# Patient Record
Sex: FEMALE | Race: WHITE | Employment: UNEMPLOYED | RURAL
[De-identification: names, ages, dates, MRNs, and addresses within clinical notes are randomized per-mention and may not be internally consistent; named-entity substitution may affect disease eponyms.]

---

## 2018-08-21 ENCOUNTER — OFFICE VISIT (OUTPATIENT)
Dept: FAMILY MEDICINE CLINIC | Age: 28
End: 2018-08-21

## 2018-08-21 VITALS
TEMPERATURE: 97.5 F | RESPIRATION RATE: 16 BRPM | HEIGHT: 65 IN | SYSTOLIC BLOOD PRESSURE: 110 MMHG | BODY MASS INDEX: 34.16 KG/M2 | HEART RATE: 60 BPM | DIASTOLIC BLOOD PRESSURE: 75 MMHG | WEIGHT: 205 LBS | OXYGEN SATURATION: 96 %

## 2018-08-21 DIAGNOSIS — F41.9 ANXIETY AND DEPRESSION: ICD-10-CM

## 2018-08-21 DIAGNOSIS — Z76.89 ENCOUNTER TO ESTABLISH CARE WITH NEW DOCTOR: ICD-10-CM

## 2018-08-21 DIAGNOSIS — M79.89 LEG SWELLING: ICD-10-CM

## 2018-08-21 DIAGNOSIS — F32.A ANXIETY AND DEPRESSION: ICD-10-CM

## 2018-08-21 DIAGNOSIS — M25.552 PAIN OF BOTH HIP JOINTS: Primary | ICD-10-CM

## 2018-08-21 DIAGNOSIS — M25.551 PAIN OF BOTH HIP JOINTS: Primary | ICD-10-CM

## 2018-08-21 DIAGNOSIS — G43.909 MIGRAINE WITHOUT STATUS MIGRAINOSUS, NOT INTRACTABLE, UNSPECIFIED MIGRAINE TYPE: ICD-10-CM

## 2018-08-21 RX ORDER — TOPIRAMATE 100 MG/1
100 TABLET, FILM COATED ORAL 2 TIMES DAILY
Qty: 60 TAB | Refills: 2 | Status: SHIPPED | OUTPATIENT
Start: 2018-08-21 | End: 2018-12-17 | Stop reason: SDUPTHER

## 2018-08-21 RX ORDER — DICLOFENAC SODIUM 50 MG/1
50 TABLET, DELAYED RELEASE ORAL
Qty: 30 TAB | Refills: 0 | Status: SHIPPED | OUTPATIENT
Start: 2018-08-21 | End: 2018-09-21 | Stop reason: SDUPTHER

## 2018-08-21 RX ORDER — BUPROPION HYDROCHLORIDE 100 MG/1
100 TABLET ORAL 2 TIMES DAILY
Qty: 60 TAB | Refills: 2 | Status: SHIPPED | OUTPATIENT
Start: 2018-08-21 | End: 2018-12-17 | Stop reason: SDUPTHER

## 2018-08-21 NOTE — PATIENT INSTRUCTIONS
Try compression stockings to help with tightness of hip. You may take Diclofenac 50mg every 8 hours as needed for pain. Hamstring Syndrome: Care Instructions  Your Care Instructions    The hamstring muscles are the three muscles in the back of the thigh. The sciatic nerve is a large nerve that runs from the low back down the legs. Hamstring syndrome is a condition caused by pressure on this nerve. The nerve may be pinched between the hamstring muscles and the pelvic bone or by the band of tissue that connects the hamstring muscles. This condition can cause pain in the hip and buttock and sometimes numbness down the back of the leg. It may hurt to sit down or stretch the hamstrings. You may have less pain when you lie on your back. Hamstring syndrome may be the result of wear and tear to the back and hamstrings. It is most often seen in people who play sports that involve running, kicking, or jumping. Other problems can cause leg pain and numbness. To diagnose hamstring syndrome, the doctor will ask about your symptoms and your activities and examine your leg. Hamstring syndrome usually gets better in a few weeks with rest and home care. The doctor may recommend exercises to stretch and strengthen your hip muscles. If home care doesn't help, your doctor may suggest a steroid shot to help reduce pain and swelling. Follow-up care is a key part of your treatment and safety. Be sure to make and go to all appointments, and call your doctor if you are having problems. It's also a good idea to know your test results and keep a list of the medicines you take. How can you care for yourself at home? · Ask your doctor if you can take an over-the-counter pain medicine, such as acetaminophen (Tylenol), ibuprofen (Advil, Motrin), or naproxen (Aleve). Be safe with medicines. Read and follow all instructions on the label. · Put ice or a cold pack on the painful area for 10 to 20 minutes at a time.  Try to do this every 1 to 2 hours for the next 3 days (when you are awake) or until the swelling goes down. Put a thin cloth between the ice and your skin. · After 2 or 3 days, if your swelling is gone, apply heat. Put a warm water bottle, a heating pad set on low, or a warm cloth over the painful area. Do not go to sleep with a heating pad on your skin. · Avoid sitting if possible, unless it feels better than standing. · Alternate lying down with short walks. Increase your walking distance as you are able to walk without making your symptoms worse. · Don't do anything that makes your symptoms worse. Return to your usual level of activity slowly. When should you call for help? Watch closely for changes in your health, and be sure to contact your doctor if:    · You have new or worse pain.     · You have new symptoms.     · You do not get better as expected. Where can you learn more? Go to http://viri-jean.info/. Enter L763 in the search box to learn more about \"Hamstring Syndrome: Care Instructions. \"  Current as of: November 29, 2017  Content Version: 11.7  © 3948-2222 [employer]. Care instructions adapted under license by [employer] (which disclaims liability or warranty for this information). If you have questions about a medical condition or this instruction, always ask your healthcare professional. [address] any warranty or liability for your use of this information. Iliotibial Band Syndrome: Exercises  Your Care Instructions  Here are some examples of typical rehabilitation exercises for your condition. Start each exercise slowly. Ease off the exercise if you start to have pain. Your doctor or physical therapist will tell you when you can start these exercises and which ones will work best for you. How to do the exercises  Iliotibial band stretch    1. Lean sideways against a wall.  If you are not steady on your feet, hold on to a chair or counter. 2. Stand on the leg with the affected hip, with that leg close to the wall. Then cross your other leg in front of it. 3. Let your affected hip drop out to the side of your body and against the wall. Then lean away from your affected hip until you feel a stretch. 4. Hold the stretch for 15 to 30 seconds. 5. Repeat 2 to 4 times. Piriformis stretch    1. Lie on your back with your legs straight. 2. Lift your affected leg and bend your knee. With your opposite hand, reach across your body, and then gently pull your knee toward your opposite shoulder. 3. Hold the stretch for 15 to 30 seconds. 4. Repeat 2 to 4 times. Hamstring wall stretch    1. Lie on your back in a doorway, with your good leg through the open door. 2. Slide your affected leg up the wall to straighten your knee. You should feel a gentle stretch down the back of your leg. 1. Do not arch your back. 2. Do not bend either knee. 3. Keep one heel touching the floor and the other heel touching the wall. Do not point your toes. 3. Hold the stretch for at least 1 minute to begin. Then try to lengthen the time you hold the stretch to as long as 6 minutes. 4. Repeat 2 to 4 times. 5. If you do not have a place to do this exercise in a doorway, there is another way to do it:  6. Lie on your back, and bend the knee of your affected leg. 7. Loop a towel under the ball and toes of that foot, and hold the ends of the towel in your hands. 8. Straighten your knee, and slowly pull back on the towel. You should feel a gentle stretch down the back of your leg. 9. Hold the stretch for 15 to 30 seconds. Or even better, hold the stretch for 1 minute if you can. 10. Repeat 2 to 4 times. Follow-up care is a key part of your treatment and safety. Be sure to make and go to all appointments, and call your doctor if you are having problems. It's also a good idea to know your test results and keep a list of the medicines you take.   Where can you learn more?  Go to http://viri-jean.info/. Enter P252 in the search box to learn more about \"Iliotibial Band Syndrome: Exercises. \"  Current as of: November 29, 2017  Content Version: 11.7  © 1036-3353 [employer]. Care instructions adapted under license by [employer] (which disclaims liability or warranty for this information). If you have questions about a medical condition or this instruction, always ask your healthcare professional. [address] any warranty or liability for your use of this information. A Healthy Lifestyle: Care Instructions  Your Care Instructions    A healthy lifestyle can help you feel good, stay at a healthy weight, and have plenty of energy for both work and play. A healthy lifestyle is something you can share with your whole family. A healthy lifestyle also can lower your risk for serious health problems, such as high blood pressure, heart disease, and diabetes. You can follow a few steps listed below to improve your health and the health of your family. Follow-up care is a key part of your treatment and safety. Be sure to make and go to all appointments, and call your doctor if you are having problems. It's also a good idea to know your test results and keep a list of the medicines you take. How can you care for yourself at home? · Do not eat too much sugar, fat, or fast foods. You can still have dessert and treats now and then. The goal is moderation. · Start small to improve your eating habits. Pay attention to portion sizes, drink less juice and soda pop, and eat more fruits and vegetables. ¨ Eat a healthy amount of food. A 3-ounce serving of meat, for example, is about the size of a deck of cards. Fill the rest of your plate with vegetables and whole grains. ¨ Limit the amount of soda and sports drinks you have every day. Drink more water when you are thirsty.   ¨ Eat at least 5 servings of fruits and vegetables every day. It may seem like a lot, but it is not hard to reach this goal. A serving or helping is 1 piece of fruit, 1 cup of vegetables, or 2 cups of leafy, raw vegetables. Have an apple or some carrot sticks as an afternoon snack instead of a candy bar. Try to have fruits and/or vegetables at every meal.  · Make exercise part of your daily routine. You may want to start with simple activities, such as walking, bicycling, or slow swimming. Try to be active 30 to 60 minutes every day. You do not need to do all 30 to 60 minutes all at once. For example, you can exercise 3 times a day for 10 or 20 minutes. Moderate exercise is safe for most people, but it is always a good idea to talk to your doctor before starting an exercise program.  · Keep moving. [employer] the lawn, work in the garden, or [employer]. Take the stairs instead of the elevator at work. · If you smoke, quit. People who smoke have an increased risk for heart attack, stroke, cancer, and other lung illnesses. Quitting is hard, but there are ways to boost your chance of quitting tobacco for good. ¨ Use nicotine gum, patches, or lozenges. ¨ Ask your doctor about stop-smoking programs and medicines. ¨ Keep trying. In addition to reducing your risk of diseases in the future, you will notice some benefits soon after you stop using tobacco. If you have shortness of breath or asthma symptoms, they will likely get better within a few weeks after you quit. · Limit how much alcohol you drink. Moderate amounts of alcohol (up to 2 drinks a day for men, 1 drink a day for women) are okay. But drinking too much can lead to liver problems, high blood pressure, and other health problems. Family health  If you have a family, there are many things you can do together to improve your health. · Eat meals together as a family as often as possible. · Eat healthy foods. This includes fruits, vegetables, lean meats and dairy, and whole grains.   · Include your family in your fitness plan. Most people think of activities such as jogging or tennis as the way to fitness, but there are many ways you and your family can be more active. Anything that makes you breathe hard and gets your heart pumping is exercise. Here are some tips:  ¨ Walk to do errands or to take your child to school or the bus. ¨ Go for a family bike ride after dinner instead of watching TV. Where can you learn more? Go to http://viri-jean.info/. Enter X689 in the search box to learn more about \"A Healthy Lifestyle: Care Instructions. \"  Current as of: December 7, 2017  Content Version: 11.7  © 5124-7609 [employer]. Care instructions adapted under license by [employer] (which disclaims liability or warranty for this information). If you have questions about a medical condition or this instruction, always ask your healthcare professional. [address] any warranty or liability for your use of this information.

## 2018-08-21 NOTE — MR AVS SNAPSHOT
Anselmo Souza 
 
 
 2005 [address] [address] 
[phone] Patient: Sintia Hay MRN: [id number] LFF:12/25/4095 Visit Information Date & Time Provider Department Dept. Phone Encounter #  
 8/21/2018 11:00 AM Shanita Holley MD [facility] [id number] Upcoming Health Maintenance Date Due Pneumococcal 19-64 Medium Risk (1 of 1 - PPSV23) 11/25/2009 DTaP/Tdap/Td series (1 - Tdap) 11/25/2011 PAP AKA CERVICAL CYTOLOGY 11/25/2011 Influenza Age 5 to Adult 8/1/2018 Allergies as of 8/21/2018  Review Complete On: 8/21/2018 By: Emeka Willis LPN Severity Noted Reaction Type Reactions Sulfur High 04/28/2011   Systemic Hives Current Immunizations  Never Reviewed No immunizations on file. Not reviewed this visit You Were Diagnosed With   
  
 Codes Comments Anxiety and depression    -  Primary ICD-10-CM: F41.9, F32.9 ICD-9-CM: 300.00, 311 Migraine without status migrainosus, not intractable, unspecified migraine type     ICD-10-CM: G43.909 ICD-9-CM: 346.90 Pain of both hip joints     ICD-10-CM: M25.551, M25.552 ICD-9-CM: 719.45 Vitals BP Pulse Temp Resp Height(growth percentile) Weight(growth percentile) 110/75 (BP 1 Location: Right arm, BP Patient Position: Sitting) 60 97.5 °F (36.4 °C) (Oral) 16 5' 4.5\" (1.638 m) 205 lb (93 kg) LMP SpO2 BMI OB Status Smoking Status 08/18/2018 96% 34.64 kg/m2 Having regular periods Current Every Day Smoker Vitals History BMI and BSA Data Body Mass Index Body Surface Area  
 34.64 kg/m 2 2.06 m 2 Preferred Pharmacy Pharmacy Name Phone [address] [place] [place], [place] - [address] [phone] Your Updated Medication List  
  
   
This list is accurate as of 8/21/18 12:41 PM.  Always use your most recent med list.  
  
  
  
  
 buPROPion 100 mg tablet Commonly known as:  [facility] Take 1 Tab by mouth two (2) times a day. diclofenac EC 50 mg EC tablet Commonly known as:  VOLTAREN Take 1 Tab by mouth every eight (8) hours as needed. topiramate 100 mg tablet Commonly known as:  TOPAMAX Take 1 Tab by mouth two (2) times a day. Prescriptions Sent to Pharmacy Refills buPROPion (WELLBUTRIN) 100 mg tablet 2 Sig: Take 1 Tab by mouth two (2) times a day. Class: Normal  
 Pharmacy: [address] [place] [address] #: [phone] Route: Oral  
 topiramate (TOPAMAX) 100 mg tablet 2 Sig: Take 1 Tab by mouth two (2) times a day. Class: Normal  
 Pharmacy: [address] [place] [address] #: [phone] Route: Oral  
 diclofenac EC (VOLTAREN) 50 mg EC tablet 0 Sig: Take 1 Tab by mouth every eight (8) hours as needed. Class: Normal  
 Pharmacy: [address] [place] [address] #: [phone] Route: Oral  
  
Patient Instructions Try compression stockings to help with tightness of hip. You may take Diclofenac 50mg every 8 hours as needed for pain. Hamstring Syndrome: Care Instructions Your Care Instructions The hamstring muscles are the three muscles in the back of the thigh. The sciatic nerve is a large nerve that runs from the low back down the legs. Hamstring syndrome is a condition caused by pressure on this nerve. The nerve may be pinched between the hamstring muscles and the pelvic bone or by the band of tissue that connects the hamstring muscles. This condition can cause pain in the hip and buttock and sometimes numbness down the back of the leg. It may hurt to sit down or stretch the hamstrings. You may have less pain when you lie on your back.  
Hamstring syndrome may be the result of wear and tear to the back and hamstrings. It is most often seen in people who play sports that involve running, kicking, or jumping. Other problems can cause leg pain and numbness. To diagnose hamstring syndrome, the doctor will ask about your symptoms and your activities and examine your leg. Hamstring syndrome usually gets better in a few weeks with rest and home care. The doctor may recommend exercises to stretch and strengthen your hip muscles. If home care doesn't help, your doctor may suggest a steroid shot to help reduce pain and swelling. Follow-up care is a key part of your treatment and safety. Be sure to make and go to all appointments, and call your doctor if you are having problems. It's also a good idea to know your test results and keep a list of the medicines you take. How can you care for yourself at home? · Ask your doctor if you can take an over-the-counter pain medicine, such as acetaminophen (Tylenol), ibuprofen (Advil, Motrin), or naproxen (Aleve). Be safe with medicines. Read and follow all instructions on the label. · Put ice or a cold pack on the painful area for 10 to 20 minutes at a time. Try to do this every 1 to 2 hours for the next 3 days (when you are awake) or until the swelling goes down. Put a thin cloth between the ice and your skin. · After 2 or 3 days, if your swelling is gone, apply heat. Put a warm water bottle, a heating pad set on low, or a warm cloth over the painful area. Do not go to sleep with a heating pad on your skin. · Avoid sitting if possible, unless it feels better than standing. · Alternate lying down with short walks. Increase your walking distance as you are able to walk without making your symptoms worse. · Don't do anything that makes your symptoms worse. Return to your usual level of activity slowly. When should you call for help? Watch closely for changes in your health, and be sure to contact your doctor if: 
  · You have new or worse pain.  
  · You have new symptoms.   · You do not get better as expected. Where can you learn more? Go to http://viri-jean.info/. Enter S623 in the search box to learn more about \"Hamstring Syndrome: Care Instructions. \" Current as of: November 29, 2017 Content Version: 11.7 © 7785-3219 [employer]. Care instructions adapted under license by [employer] (which disclaims liability or warranty for this information). If you have questions about a medical condition or this instruction, always ask your healthcare professional. [place] [address] any warranty or liability for your use of this information. Iliotibial Band Syndrome: Exercises Your Care Instructions Here are some examples of typical rehabilitation exercises for your condition. Start each exercise slowly. Ease off the exercise if you start to have pain. Your doctor or physical therapist will tell you when you can start these exercises and which ones will work best for you. How to do the exercises Iliotibial band stretch 1. Lean sideways against a wall. If you are not steady on your feet, hold on to a chair or counter. 2. Stand on the leg with the affected hip, with that leg close to the wall. Then cross your other leg in front of it. 3. Let your affected hip drop out to the side of your body and against the wall. Then lean away from your affected hip until you feel a stretch. 4. Hold the stretch for 15 to 30 seconds. 5. Repeat 2 to 4 times. Piriformis stretch 1. Lie on your back with your legs straight. 2. Lift your affected leg and bend your knee. With your opposite hand, reach across your body, and then gently pull your knee toward your opposite shoulder. 3. Hold the stretch for 15 to 30 seconds. 4. Repeat 2 to 4 times. Hamstring wall stretch 1. Lie on your back in a doorway, with your good leg through the open door. 2. Slide your affected leg up the wall to straighten your knee.  You should feel a gentle stretch down the back of your leg. 1. Do not arch your back. 2. Do not bend either knee. 3. Keep one heel touching the floor and the other heel touching the wall. Do not point your toes. 3. Hold the stretch for at least 1 minute to begin. Then try to lengthen the time you hold the stretch to as long as 6 minutes. 4. Repeat 2 to 4 times. 5. If you do not have a place to do this exercise in a doorway, there is another way to do it: 6. Lie on your back, and bend the knee of your affected leg. 7. Loop a towel under the ball and toes of that foot, and hold the ends of the towel in your hands. 8. Straighten your knee, and slowly pull back on the towel. You should feel a gentle stretch down the back of your leg. 9. Hold the stretch for 15 to 30 seconds. Or even better, hold the stretch for 1 minute if you can. 10. Repeat 2 to 4 times. Follow-up care is a key part of your treatment and safety. Be sure to make and go to all appointments, and call your doctor if you are having problems. It's also a good idea to know your test results and keep a list of the medicines you take. Where can you learn more? Go to http://viri-jean.info/. Enter P252 in the search box to learn more about \"Iliotibial Band Syndrome: Exercises. \" Current as of: November 29, 2017 Content Version: 11.7 © 9999-2158 [employer], SEAT 4a. Care instructions adapted under license by "[employer] ([employer])" (which disclaims liability or warranty for this information). If you have questions about a medical condition or this instruction, always ask your healthcare professional. [address] any warranty or liability for your use of this information. A Healthy Lifestyle: Care Instructions Your Care Instructions A healthy lifestyle can help you feel good, stay at a healthy weight, and have plenty of energy for both work and play.  A healthy lifestyle is something you can share with your whole family. A healthy lifestyle also can lower your risk for serious health problems, such as high blood pressure, heart disease, and diabetes. You can follow a few steps listed below to improve your health and the health of your family. Follow-up care is a key part of your treatment and safety. Be sure to make and go to all appointments, and call your doctor if you are having problems. It's also a good idea to know your test results and keep a list of the medicines you take. How can you care for yourself at home? · Do not eat too much sugar, fat, or fast foods. You can still have dessert and treats now and then. The goal is moderation. · Start small to improve your eating habits. Pay attention to portion sizes, drink less juice and soda pop, and eat more fruits and vegetables. ¨ Eat a healthy amount of food. A 3-ounce serving of meat, for example, is about the size of a deck of cards. Fill the rest of your plate with vegetables and whole grains. ¨ Limit the amount of soda and sports drinks you have every day. Drink more water when you are thirsty. ¨ Eat at least 5 servings of fruits and vegetables every day. It may seem like a lot, but it is not hard to reach this goal. A serving or helping is 1 piece of fruit, 1 cup of vegetables, or 2 cups of leafy, raw vegetables. Have an apple or some carrot sticks as an afternoon snack instead of a candy bar. Try to have fruits and/or vegetables at every meal. 
· Make exercise part of your daily routine. You may want to start with simple activities, such as walking, bicycling, or slow swimming. Try to be active 30 to 60 minutes every day. You do not need to do all 30 to 60 minutes all at once. For example, you can exercise 3 times a day for 10 or 20 minutes.  Moderate exercise is safe for most people, but it is always a good idea to talk to your doctor before starting an exercise program. 
 · Keep moving. Ismael Bonds the lawn, work in the garden, or [employer]. Take the stairs instead of the elevator at work. · If you smoke, quit. People who smoke have an increased risk for heart attack, stroke, cancer, and other lung illnesses. Quitting is hard, but there are ways to boost your chance of quitting tobacco for good. ¨ Use nicotine gum, patches, or lozenges. ¨ Ask your doctor about stop-smoking programs and medicines. ¨ Keep trying. In addition to reducing your risk of diseases in the future, you will notice some benefits soon after you stop using tobacco. If you have shortness of breath or asthma symptoms, they will likely get better within a few weeks after you quit. · Limit how much alcohol you drink. Moderate amounts of alcohol (up to 2 drinks a day for men, 1 drink a day for women) are okay. But drinking too much can lead to liver problems, high blood pressure, and other health problems. Family health If you have a family, there are many things you can do together to improve your health. · Eat meals together as a family as often as possible. · Eat healthy foods. This includes fruits, vegetables, lean meats and dairy, and whole grains. · Include your family in your fitness plan. Most people think of activities such as jogging or tennis as the way to fitness, but there are many ways you and your family can be more active. Anything that makes you breathe hard and gets your heart pumping is exercise. Here are some tips: 
¨ Walk to do errands or to take your child to school or the bus. ¨ Go for a family bike ride after dinner instead of watching TV. Where can you learn more? Go to http://viri-jean.info/. Enter I446 in the search box to learn more about \"A Healthy Lifestyle: Care Instructions. \" Current as of: December 7, 2017 Content Version: 11.7 © 0791-6232 [employer].  Care instructions adapted under license by [address] (which disclaims liability or warranty for this information). If you have questions about a medical condition or this instruction, always ask your healthcare professional. [address] any warranty or liability for your use of this information. Introducing hospitals & HEALTH SERVICES! [facility] introduces [employer] patient portal. Now you can access parts of your medical record, email your doctor's office, and request medication refills online. 1. In your internet browser, go to [email or web address] 2. Click on the First Time User? Click Here link in the Sign In box. You will see the New Member Sign Up page. 3. Enter your [employer] Access Code exactly as it appears below. You will not need to use this code after youve completed the sign-up process. If you do not sign up before the expiration date, you must request a new code. · [employer] Access Code: [email or web address] Expires: 11/19/2018 10:58 AM 
 
4. Enter the last four digits of your Social Security Number (xxxx) and Date of Birth (mm/dd/yyyy) as indicated and click Submit. You will be taken to the next sign-up page. 5. Create a [employer] ID. This will be your [employer] login ID and cannot be changed, so think of one that is secure and easy to remember. 6. Create a [employer] password. You can change your password at any time. 7. Enter your Password Reset Question and Answer. This can be used at a later time if you forget your password. 8. Enter your e-mail address. You will receive e-mail notification when new information is available in [address] 9. Click Sign Up. You can now view and download portions of your medical record. 10. Click the Download Summary menu link to download a portable copy of your medical information. If you have questions, please visit the Frequently Asked Questions section of the [employer] website.  Remember, [employer] is NOT to be used for urgent needs. For medical emergencies, dial 911. Now available from your iPhone and Android! Please provide this summary of care documentation to your next provider. Your primary care clinician is listed as NONE. If you have any questions after today's visit, please call [phone].

## 2018-08-21 NOTE — PROGRESS NOTES
Cata Leos is an 32 y.o. female who presents to establish care and cc of bilaterally leg pain    Patient was previously receiving care at: Urgent Care    Currently does have complaints of leg pain. She reports that after working her [address] job she will feel increased fluid collection in her feet. Also has right hip pain that is worse when she is at work and moving a lot a work and she starts to feel stiff. Medical history significant for: [place] (lifestyle controlled), Depression and anxiety, migraines,     Gyn Care  LMP: 8/18 occur every month, can be painful but takes ibu PRN  Family Planning: Abstinence   Last pap:  4-5 years ago, no abnormals  Denies cervical discharge or irritation    Social History  Cigarette use: 1/2 ppd, has not thought about quitting  Alcohol use: none  Denies elicit drug use  Moved back to area after completing school. Works at [employer] as a . Family History  Breast cancer: No  Colon cancer: No  Heart disease in men<55 or women<65: No    Current medications include:   Current Outpatient Prescriptions   Medication Sig    buPROPion (WELLBUTRIN) 100 mg tablet Take 1 Tab by mouth two (2) times a day.  topiramate (TOPAMAX) 100 mg tablet Take 1 Tab by mouth two (2) times a day.  diclofenac EC (VOLTAREN) 50 mg EC tablet Take 1 Tab by mouth every eight (8) hours as needed. No current facility-administered medications for this visit. Allergies - reviewed: Allergies   Allergen Reactions    Sulfur Hives         Past Medical History - reviewed:  Past Medical History:   Diagnosis Date    Chronic kidney disease     kidnsy stones    Psychiatric disorder          Past Surgical History - reviewed:  Past Surgical History:   Procedure Laterality Date    HX ORTHOPAEDIC      right wrist dislocated         Family History - reviewed:  History reviewed. No pertinent family history.       Social History - reviewed:  Social History     Social History    Marital status: SINGLE     Spouse name: N/A    Number of children: N/A    Years of education: N/A     Occupational History    Not on file. Social History Main Topics    Smoking status: Current Every Day Smoker    Smokeless tobacco: Never Used    Alcohol use Yes    Drug use: Not on file    Sexual activity: Not on file     Other Topics Concern    Not on file     Social History Narrative         Immunizations - reviewed: There is no immunization history on file for this patient. Health Maintenance reviewed -    Review of Systems   Review of systems performed, pertinent positives and negatives mentioned in the HPI. Objective:     Visit Vitals    /75 (BP 1 Location: Right arm, BP Patient Position: Sitting)    Pulse 60    Temp 97.5 °F (36.4 °C) (Oral)    Resp 16    Ht 5' 4.5\" (1.638 m)    Wt 205 lb (93 kg)    LMP 08/18/2018    SpO2 96%    BMI 34.64 kg/m2       General appearance - alert, well appearing, and in no distress  Eyes - pupils equal and reactive, extraocular eye movements intact  Mouth - mucous membranes moist, pharynx normal without lesions  Neck - supple, no significant adenopathy  Chest - clear to auscultation, no wheezes, rales or rhonchi, symmetric air entry  Heart - normal rate, regular rhythm, normal S1, S2, no murmurs, rubs, clicks or gallops  Abdomen - soft, nontender, nondistended, no masses or organomegaly  Neurological - alert, oriented, normal speech, no focal findings or movement disorder noted  Musculoskeletal - limited hip flexion bilaterally due to hamstring stretch, negative straight leg test bilaterally. No spinal tenderness to palpation. No tenderness to palpation over hips bilaterally  Extremities - peripheral pulses normal, no pedal edema, no clubbing or cyanosis  Skin - normal coloration and turgor, no rashes, no suspicious skin lesions noted      Assessment and Plan   1. Pain of both hip joints - Appears to be 2/2 to tight IT band and hamstrings.  No sciatica on exam today. Patient likely has some gastritis from frequent use of NSAIDs.  - diclofenac EC (VOLTAREN) 50 mg EC tablet; Take 1 Tab by mouth every eight (8) hours as needed. Dispense: 30 Tab; Refill: 0  -  Instructed to start taking Zantac OTC in the AM to prevent gastritis  - Advised to stop taking other NSAIDs while taking this medication and the importance of eating prior to taking Diclofenac.  - F/u in a couple of weeks, if patient is still having pain will consider referral to PT    2. Encounter to establish care with new doctor  - Medications and problems reviewed  - Patient without previous established PCP to obtain records  - F/u in 3 weeks for Well woman exam    3. Anxiety and depression -  - buPROPion (WELLBUTRIN) 100 mg tablet; Take 1 Tab by mouth two (2) times a day. Dispense: 60 Tab; Refill: 2    4. Migraine without status migrainosus, not intractable, unspecified migraine type  - topiramate (TOPAMAX) 100 mg tablet; Take 1 Tab by mouth two (2) times a day. Dispense: 60 Tab; Refill: 2    5. Leg swelling - no edema on exam today. Likely 2/2 to persistent standing at work. - Advised compression stockings while at work    Follow-up Disposition:  Return in about 3 weeks (around 9/11/2018) for Cristin Jaramillo 39. I discussed the aforementioned diagnoses with the patient as well as the plan of care. All questions were answered and an AVS was provided.        Marvin Olmstead MD   Family medicine Resident

## 2018-08-21 NOTE — PROGRESS NOTES
Reviewed record in preparation for visit and have obtained necessary documentation. Patient did not bring medications to visit for review. Information provided on Advanced Directive, Living Will. Body mass index is 34.64 kg/(m^2).    Health Maintenance Due   Topic Date Due    Pneumococcal 19-64 Medium Risk (1 of 1 - PPSV23) 11/25/2009    DTaP/Tdap/Td series (1 - Tdap) 11/25/2011    PAP AKA CERVICAL CYTOLOGY  11/25/2011    Influenza Age 9 to Adult  08/01/2018

## 2018-09-21 ENCOUNTER — TELEPHONE (OUTPATIENT)
Dept: FAMILY MEDICINE CLINIC | Age: 28
End: 2018-09-21

## 2018-09-21 DIAGNOSIS — M25.552 PAIN OF BOTH HIP JOINTS: ICD-10-CM

## 2018-09-21 DIAGNOSIS — M25.551 PAIN OF BOTH HIP JOINTS: ICD-10-CM

## 2018-09-21 DIAGNOSIS — R12 HEARTBURN: Primary | ICD-10-CM

## 2018-09-21 NOTE — TELEPHONE ENCOUNTER
----- Message from Aniya Franks sent at 9/21/2018  9:43 AM EDT -----  Regarding: Dr. Winters/Telephone/Refill   Pt needs an Rx refill for diclofenac 50mg going to the pharmacy on file [facility] [address] [facility] Best contact number is [phone].

## 2018-09-21 NOTE — TELEPHONE ENCOUNTER
----- Message from Barbara Anaya sent at 9/21/2018  9:43 AM EDT -----  Regarding: Dr. Winters/Telephone/Refill   Pt needs an Rx refill for diclofenac 50mg going to the pharmacy on file [facility] [address] [facility], [place], [facility] Best contact number is [phone].

## 2018-09-21 NOTE — LETTER
10/1/2018 2:00 PM 
 
Ms. Shayy Amaya Harsh Segura Dear Ms. Amaya, We have been unable to reach you by phone to notify you : 
Attempted to call. Unsuccessful. voicemail box full. Need to inform you per Dr Chris Scott Will send prescription to pharmacy however, if she is still requiring its use that it may be necessary to start physical therapy at least for a couple of visits to aid in the overall benefit of the medication. Will send referral for local PT. Also it is important to continue to take Zantac 150mg in the morning when taking Diclofenac to protect stomach. I will send this into the pharmacy. Take Diclofenac with food.  
   
 
 
Sincerely, PORTILLO GARCÍA & LEIGH LAMAS [facility]

## 2018-09-23 RX ORDER — RANITIDINE 150 MG/1
150 TABLET, FILM COATED ORAL 2 TIMES DAILY
Qty: 30 TAB | Refills: 2 | Status: SHIPPED | OUTPATIENT
Start: 2018-09-23 | End: 2019-03-07

## 2018-09-23 RX ORDER — DICLOFENAC SODIUM 50 MG/1
50 TABLET, DELAYED RELEASE ORAL
Qty: 30 TAB | Refills: 0 | Status: SHIPPED | OUTPATIENT
Start: 2018-09-23 | End: 2018-12-17

## 2018-09-24 NOTE — TELEPHONE ENCOUNTER
Attempted to call. Unsuccessful. voicemail box full. Need to inform pt per Dr María Londono  Will send prescription to pharmacy however, if she is still requiring its use that it may be necessary to start physical therapy at least for a couple of visits to aid in the overall benefit of the medication. Will send referral for local PT. Also it is important to continue to take Zantac 150mg in the morning when taking Diclofenac to protect stomach. I will send this into the pharmacy. Take Diclofenac with food.

## 2018-10-01 NOTE — TELEPHONE ENCOUNTER
Attempted to call. Unsuccessful. Voice mail box full. Need to inform pt per Dr Ga Merrill  Will send prescription to pharmacy however, if she is still requiring its use that it may be necessary to start physical therapy at least for a couple of visits to aid in the overall benefit of the medication. Will send referral for local PT. Also it is important to continue to take Zantac 150mg in the morning when taking Diclofenac to protect stomach. I will send this into the pharmacy.  Take Diclofenac with food.         Letter sent

## 2018-12-17 ENCOUNTER — OFFICE VISIT (OUTPATIENT)
Dept: FAMILY MEDICINE CLINIC | Age: 28
End: 2018-12-17

## 2018-12-17 VITALS
HEART RATE: 95 BPM | OXYGEN SATURATION: 97 % | SYSTOLIC BLOOD PRESSURE: 106 MMHG | TEMPERATURE: 97.7 F | WEIGHT: 216 LBS | DIASTOLIC BLOOD PRESSURE: 71 MMHG | BODY MASS INDEX: 36.88 KG/M2 | HEIGHT: 64 IN | RESPIRATION RATE: 20 BRPM

## 2018-12-17 DIAGNOSIS — M79.672 BILATERAL FOOT PAIN: Primary | ICD-10-CM

## 2018-12-17 DIAGNOSIS — R52 PAIN: ICD-10-CM

## 2018-12-17 DIAGNOSIS — M79.671 BILATERAL FOOT PAIN: Primary | ICD-10-CM

## 2018-12-17 DIAGNOSIS — F32.A ANXIETY AND DEPRESSION: ICD-10-CM

## 2018-12-17 DIAGNOSIS — M25.552 PAIN OF BOTH HIP JOINTS: ICD-10-CM

## 2018-12-17 DIAGNOSIS — F41.9 ANXIETY AND DEPRESSION: ICD-10-CM

## 2018-12-17 DIAGNOSIS — M25.551 PAIN OF BOTH HIP JOINTS: ICD-10-CM

## 2018-12-17 DIAGNOSIS — G43.909 MIGRAINE WITHOUT STATUS MIGRAINOSUS, NOT INTRACTABLE, UNSPECIFIED MIGRAINE TYPE: ICD-10-CM

## 2018-12-17 DIAGNOSIS — M72.2 PLANTAR FASCIITIS, BILATERAL: ICD-10-CM

## 2018-12-17 RX ORDER — DICLOFENAC SODIUM 50 MG/1
50 TABLET, DELAYED RELEASE ORAL 2 TIMES DAILY
Qty: 60 TAB | Refills: 0 | Status: SHIPPED | OUTPATIENT
Start: 2018-12-17 | End: 2019-03-11 | Stop reason: ALTCHOICE

## 2018-12-17 RX ORDER — BUPROPION HYDROCHLORIDE 100 MG/1
100 TABLET ORAL 2 TIMES DAILY
Qty: 60 TAB | Refills: 0 | Status: SHIPPED | OUTPATIENT
Start: 2018-12-17 | End: 2019-08-14 | Stop reason: SDUPTHER

## 2018-12-17 RX ORDER — TOPIRAMATE 100 MG/1
100 TABLET, FILM COATED ORAL 2 TIMES DAILY
Qty: 60 TAB | Refills: 0 | Status: SHIPPED | OUTPATIENT
Start: 2018-12-17 | End: 2019-08-14 | Stop reason: SDUPTHER

## 2018-12-17 NOTE — PROGRESS NOTES
Chief Complaint   Patient presents with    Leg Pain     Visit Vitals  /71 (BP 1 Location: Left arm, BP Patient Position: Sitting)   Pulse 95   Temp 97.7 °F (36.5 °C) (Oral)   Resp 20   Ht 5' 4\" (1.626 m)   Wt 216 lb (98 kg)   SpO2 97%   BMI 37.08 kg/m²     1. Have you been to the ER, urgent care clinic since your last visit? Hospitalized since your last visit? No    2. Have you seen or consulted any other health care providers outside of the [address] [place] [address] since your last visit? Include any pap smears or colon screening.  No    Reviewed record in preparation for visit and have necessary documentation  Pt did not bring medication to office visit for review  opportunity was given for questions  Goals that were addressed and/or need to be completed during or after this appointment include   Health Maintenance Due   Topic Date Due    Pneumococcal 19-64 Medium Risk (1 of 1 - PPSV23) 11/25/2009    DTaP/Tdap/Td series (1 - Tdap) 11/25/2011    PAP AKA CERVICAL CYTOLOGY  11/25/2011    Influenza Age 9 to Adult  08/01/2018

## 2018-12-17 NOTE — PATIENT INSTRUCTIONS
Plantar Fasciitis: Care Instructions  Your Care Instructions    Plantar fasciitis is pain and inflammation of the plantar fascia, the tissue at the bottom of your foot that connects the heel bone to the toes. The plantar fascia also supports the arch. If you strain the plantar fascia, it can develop small tears and cause heel pain when you stand or walk. Plantar fasciitis can be caused by running or other sports. It also may occur in people who are overweight or who have high arches or flat feet. You may get plantar fasciitis if you walk or stand for long periods, or have a tight Achilles tendon or calf muscles. You can improve your foot pain with rest and other care at home. It might take a few weeks to a few months for your foot to heal completely. Follow-up care is a key part of your treatment and safety. Be sure to make and go to all appointments, and call your doctor if you are having problems. It's also a good idea to know your test results and keep a list of the medicines you take. How can you care for yourself at home? · Rest your feet often. Reduce your activity to a level that lets you avoid pain. If possible, do not run or walk on hard surfaces. · Take pain medicines exactly as directed. ? If the doctor gave you a prescription medicine for pain, take it as prescribed. ? If you are not taking a prescription pain medicine, take an over-the-counter anti-inflammatory medicine for pain and swelling, such as ibuprofen (Advil, Motrin) or naproxen (Aleve). Read and follow all instructions on the label. · Use ice massage to help with pain and swelling. You can use an ice cube or an ice cup several times a day. To make an ice cup, fill a paper cup with water and freeze it. Cut off the top of the cup until a half-inch of ice shows. Hold onto the remaining paper to use the cup. Rub the ice in small circles over the area for 5 to 7 minutes.   · Contrast baths, which alternate hot and cold water, can also help reduce swelling. But because heat alone may make pain and swelling worse, end a contrast bath with a soak in cold water. · Wear a night splint if your doctor suggests it. A night splint holds your foot with the toes pointed up and the foot and ankle at a 90-degree angle. This position gives the bottom of your foot a constant, gentle stretch. · Do simple exercises such as calf stretches and towel stretches 2 to 3 times each day, especially when you first get up in the morning. These can help the plantar fascia become more flexible. They also make the muscles that support your arch stronger. Hold these stretches for 15 to 30 seconds per stretch. Repeat 2 to 4 times. ? Stand about 1 foot from a wall. Place the palms of both hands against the wall at chest level. Lean forward against the wall, keeping one leg with the knee straight and heel on the ground while bending the knee of the other leg.  ? Sit down on the floor or a mat with your feet stretched in front of you. Roll up a towel lengthwise, and loop it over the ball of your foot. Holding the towel at both ends, gently pull the towel toward you to stretch your foot. · Wear shoes with good arch support. Athletic shoes or shoes with a well-cushioned sole are good choices. · Try heel cups or shoe inserts (orthotics) to help cushion your heel. You can buy these at many shoe stores. · Put on your shoes as soon as you get out of bed. Going barefoot or wearing slippers may make your pain worse. · Reach and stay at a good weight for your height. This puts less strain on your feet. When should you call for help? Call your doctor now or seek immediate medical care if:    · You have heel pain with fever, redness, or warmth in your heel.     · You cannot put weight on the sore foot.    Watch closely for changes in your health, and be sure to contact your doctor if:    · You have numbness or tingling in your heel.     · Your heel pain lasts more than 2 weeks. Where can you learn more? Go to http://viri-jean.info/. Robert Florez in the search box to learn more about \"Plantar Fasciitis: Care Instructions. \"  Current as of: November 29, 2017  Content Version: 11.8  © 8844-6602 [employer]. Care instructions adapted under license by [employer] (which disclaims liability or warranty for this information). If you have questions about a medical condition or this instruction, always ask your healthcare professional. [place] [address] any warranty or liability for your use of this information.     [facility]  [address], [place], [address]  Phone: [phone]

## 2018-12-17 NOTE — PROGRESS NOTES
Jah Guan  29 y.o. female  1990  [facility]  [id number]     [facility]: Progress Note       Encounter Date: 12/17/2018    Chief Complaint   Patient presents with    Leg Pain     History of Present Illness   Jah Guan is a 29 y.o. female who presents to clinic today for:    Bilateral foot pain-She states she wakes up in the morning and has pain on first step from getting out of bed; the pain goes away then returns with in ~6-8 hours of her shift. \"Feels like fluid is built up. Feels like my feet are sponges\". She works 10-12 hour shifts as a  and at [employer] (concrete and hard wood floors). She is wearing Crocs & compression hose but states they are too tight (measured her calf in order to reorder the right size of hose). Pain noted even on her days off. Denies wearing shoe inserts. Treatment-she props her feet up at night and massages her feet for circulation. She now has some numbness and tingling in LE and toes. She states after a shift she does note edema and LE coldness however her feet are pink. Hx of normal hip xrays. She is established with a chiropractor every other week for hip alignment. Hx of JEREZ disease-saw cardiology in 2018. Health Maintenance    Health Maintenance Due   Topic Date Due    Pneumococcal 19-64 Medium Risk (1 of 1 - PPSV23) 11/25/2009    DTaP/Tdap/Td series (1 - Tdap) 11/25/2011    PAP AKA CERVICAL CYTOLOGY  11/25/2011     Review of Systems   Review of Systems   Constitutional: Negative. HENT: Negative. Eyes: Negative. Respiratory: Negative. Cardiovascular: Negative. Gastrointestinal: Negative. Genitourinary: Negative. Musculoskeletal: Positive for myalgias. Skin: Negative. Neurological: Negative. Endo/Heme/Allergies: Negative. Psychiatric/Behavioral: Negative.         Vitals/Objective:     Vitals:    12/17/18 1101   BP: 106/71   Pulse: 95   Resp: 20   Temp: 97.7 °F (36.5 °C) TempSrc: Oral   SpO2: 97%   Weight: 216 lb (98 kg)   Height: 5' 4\" (1.626 m)     Body mass index is 37.08 kg/m². Physical Exam   Musculoskeletal:        Right ankle: She exhibits normal range of motion, no swelling and no deformity. No tenderness. Achilles tendon normal.        Left ankle: She exhibits normal range of motion, no swelling and no deformity. No tenderness. Achilles tendon normal.        Feet:    Pain outlined. Positive sensation. Brisk cap refill and strong  dorsalis pedis pulse. ROM WNL. Skin intact. No results found for this or any previous visit (from the past 24 hour(s)). Assessment and Plan:   1. Pain of both hip joints      2. Bilateral foot pain    - diclofenac EC (VOLTAREN) 50 mg EC tablet; Take 1 Tab by mouth two (2) times a day. Dispense: 60 Tab; Refill: 0  - XR FOOT RT MIN 3 V; Future  - XR FOOT LT MIN 3 V; Future    3. Pain    - INFLUENZA VIRUS VAC QUAD,SPLIT,PRESV FREE SYRINGE IM  - KS IMMUNIZ ADMIN,1 SINGLE/COMB VAC/TOXOID    4. Plantar fasciitis, bilateral    - XR FOOT RT MIN 3 V; Future  - XR FOOT LT MIN 3 V; Future    5. Anxiety and depression    - buPROPion (WELLBUTRIN) 100 mg tablet; Take 1 Tab by mouth two (2) times a day. Dispense: 60 Tab; Refill: 0    6. Migraine without status migrainosus, not intractable, unspecified migraine type    - topiramate (TOPAMAX) 100 mg tablet; Take 1 Tab by mouth two (2) times a day. Dispense: 60 Tab; Refill: 0    Will get imaging. Will try diclofenac BID as well as stretching exercises. Will refer to [facility] if not better for their evaluation and possible measurement for orthotics. Discussed not taking any other NSAIDS while taking the Diclofenac. Advised patient to f/u with PCP re: increase on her wellbutrin and well woman visit. I have discussed the diagnosis with the patient and the intended plan as seen in the above orders. she has expressed understanding.   The patient has received an after-visit summary and questions were answered concerning future plans. I have discussed medication side effects and warnings with the patient as well. Electronically Signed: Diamond Keenan NP     History/Allergies   Patients past medical, surgical and family histories were reviewed and updated. Past Medical History:   Diagnosis Date    Chronic kidney disease     kidnsy stones    Psychiatric disorder       Past Surgical History:   Procedure Laterality Date    HX ORTHOPAEDIC      right wrist dislocated     Family History   Problem Relation Age of Onset    Atrial Fibrillation Mother     Diabetes Father     Heart Disease Father      Social History     Socioeconomic History    Marital status: SINGLE     Spouse name: Not on file    Number of children: Not on file    Years of education: Not on file    Highest education level: Not on file   Social Needs    Financial resource strain: Not on file    Food insecurity - worry: Not on file    Food insecurity - inability: Not on file   [employer] needs - medical: Not on file   [employer] needs - non-medical: Not on file   Occupational History    Not on file   Tobacco Use    Smoking status: Current Every Day Smoker    Smokeless tobacco: Never Used   Substance and Sexual Activity    Alcohol use: Yes    Drug use: Not on file    Sexual activity: Not on file   Other Topics Concern    Not on file   Social History Narrative    Not on file         Allergies   Allergen Reactions    Sulfur Hives       Disposition     Follow-up Disposition:  Return if symptoms worsen or fail to improve. No future appointments. Current Medications after this visit     Current Outpatient Medications   Medication Sig    diclofenac EC (VOLTAREN) 50 mg EC tablet Take 1 Tab by mouth two (2) times a day.  buPROPion (WELLBUTRIN) 100 mg tablet Take 1 Tab by mouth two (2) times a day.  topiramate (TOPAMAX) 100 mg tablet Take 1 Tab by mouth two (2) times a day.     raNITIdine (ZANTAC) 150 mg tablet Take 1 Tab by mouth two (2) times a day. No current facility-administered medications for this visit.       Medications Discontinued During This Encounter   Medication Reason    diclofenac EC (VOLTAREN) 50 mg EC tablet     buPROPion (WELLBUTRIN) 100 mg tablet Reorder    topiramate (TOPAMAX) 100 mg tablet Reorder

## 2018-12-28 ENCOUNTER — TELEPHONE (OUTPATIENT)
Dept: FAMILY MEDICINE CLINIC | Age: 28
End: 2018-12-28

## 2018-12-28 NOTE — TELEPHONE ENCOUNTER
Please inform the patient:    left foot demonstrate  tiny plantar calcaneal spur on the left. No other bone, joint or soft tissue  abnormality is shown.     Xray result letter mailed to patient. Call our office if you would like a referral to podiatry for further evaluation for possible injections or shoe orthotics. You can also call [address] [place], [address]  Phone: [phone].     Let us know if you would like a CD of your xray imaging. Thank you!   [facility]

## 2019-01-04 NOTE — TELEPHONE ENCOUNTER
Pt returned call and was advised of xray results. She states what about her right foot b/c that's the one that she experiences the most pain with. I did advise pt about going to podiatrist for further eval and she would like to do so. Pt would like a call back b/c she would like to know if anything showed up on xray of right foot. Pt is available on fridays anytime before 4:30.

## 2019-03-07 ENCOUNTER — OFFICE VISIT (OUTPATIENT)
Dept: FAMILY MEDICINE CLINIC | Age: 29
End: 2019-03-07

## 2019-03-07 VITALS
BODY MASS INDEX: 36.37 KG/M2 | HEART RATE: 86 BPM | HEIGHT: 64 IN | TEMPERATURE: 98.6 F | SYSTOLIC BLOOD PRESSURE: 131 MMHG | DIASTOLIC BLOOD PRESSURE: 69 MMHG | OXYGEN SATURATION: 98 % | RESPIRATION RATE: 16 BRPM | WEIGHT: 213 LBS

## 2019-03-07 DIAGNOSIS — N92.6 MISSED MENSES: ICD-10-CM

## 2019-03-07 DIAGNOSIS — M21.41 PES PLANUS OF BOTH FEET: ICD-10-CM

## 2019-03-07 DIAGNOSIS — M21.42 PES PLANUS OF BOTH FEET: ICD-10-CM

## 2019-03-07 DIAGNOSIS — M25.60 MORNING JOINT STIFFNESS OF MULTIPLE SITES: Primary | ICD-10-CM

## 2019-03-07 LAB
HCG URINE, QL. (POC): NEGATIVE
VALID INTERNAL CONTROL?: YES

## 2019-03-07 NOTE — PROGRESS NOTES
1. Have you been to the ER, urgent care clinic, or been hospitalized since your last visit? no    2.  Have you seen or consulted any other health care providers outside of the [address] [place] [address] since your last visit? no     Reviewed record in preparation for visit and have necessary documentation  Opportunity was given for questions  Goals that were addressed and/or need to be completed during or after this appointment include   Health Maintenance Due   Topic Date Due    Pneumococcal 19-64 Medium Risk (1 of 1 - PPSV23) 11/25/2009    DTaP/Tdap/Td series (1 - Tdap) 11/25/2011    PAP AKA CERVICAL CYTOLOGY  11/25/2011

## 2019-03-07 NOTE — PROGRESS NOTES
Leopoldo Alliance  29 y.o. female  1990  [address]  [id number]     [facility]: Progress Note       Encounter Date: 3/7/2019    Chief Complaint   Patient presents with    Foot Pain     bilateral foot pain, moving up to knees    Other     patient reports 1 negative pregnancy test and 1 positive pregnancy test at home       History provided by patient  History of Present Illness   Leopoldo Alliance is a 29 y.o. female who presents to clinic today for:    Bilateral foot pain  Patient present with cc of bilateral foot pain for months. Patient reports that pain is present with the first step out of the bed in the morning. Pain will resolve after 20-30 minutes. Pain recurs after 1-2 hours at work. She works 10-12 hour shifts at [employer] and at [employer]. She wear crocs, does daily exercises with frozen water bottle. Has had xray which show no acute changes and a bone spur. Pain is worsen on the right. . Now complaining of worsening pain in the knee and right hip. Associated stiffness in hips. States that mother says that she has 'autoimmune' problems and that it runs on her side of the family. Review of Systems   Review of Systems   Constitutional: Negative for chills and fever. Gastrointestinal: Negative for abdominal pain, constipation, diarrhea, nausea and vomiting. Musculoskeletal: Positive for joint pain and myalgias. Negative for back pain, falls and neck pain. Skin: Negative for itching and rash. Neurological: Negative for dizziness and headaches. Vitals/Objective:     Vitals:    03/07/19 1303   BP: 131/69   Pulse: 86   Resp: 16   Temp: 98.6 °F (37 °C)   TempSrc: Oral   SpO2: 98%   Weight: 213 lb (96.6 kg)   Height: 5' 4\" (1.626 m)     Body mass index is 36.56 kg/m².     Wt Readings from Last 3 Encounters:   03/07/19 213 lb (96.6 kg)   12/17/18 216 lb (98 kg)   08/21/18 205 lb (93 kg)       Physical Exam   Constitutional: She is oriented to person, place, and time. She appears well-developed and well-nourished. HENT:   Head: Normocephalic and atraumatic. Cardiovascular: Normal rate and regular rhythm. No murmur heard. Pulmonary/Chest: Effort normal and breath sounds normal.   Musculoskeletal: She exhibits no edema or tenderness. Neurological: She is alert and oriented to person, place, and time. No cranial nerve deficit. Coordination normal.        Recent Results (from the past 24 hour(s))   AMB POC URINE PREGNANCY TEST, VISUAL COLOR COMPARISON    Collection Time: 03/07/19  1:37 PM   Result Value Ref Range    VALID INTERNAL CONTROL POC Yes     HCG urine, Ql. (POC) Negative Negative     Assessment and Plan:     Encounter Diagnoses     ICD-10-CM ICD-9-CM   1. Morning joint stiffness of multiple sites M25.60 719.59   2. Pes planus of both feet M21.41 734    M21.42    3. Missed menses N92.6 626.4       1. Morning joint stiffness of multiple sites  2. Pes planus of both feet   Picture is mixed as symptoms are consistent with possible plantar fasciitis but with \"autoimmune d/o\" in family suspsion is high as well. Will check serology and refer to podiatry for possible fitting of supportive inserts.   - SED RATE (ESR)  - C REACTIVE PROTEIN, QT  - MELE IFA W/REFLEX  CASCADE  - REFERRAL TO PODIATRY    3. Missed menses  - AMB POC URINE PREGNANCY TEST, VISUAL COLOR COMPARISON  - BETA HCG, QT      I have discussed the diagnosis with the patient and the intended plan as seen in the above orders. she has expressed understanding. The patient has received an after-visit summary and questions were answered concerning future plans. I have discussed medication side effects and warnings with the patient as well. Electronically Signed: Sawyer Basurto MD     History/Allergies   Patients past medical, surgical and family histories were reviewed and updated.     Past Medical History:   Diagnosis Date    Chronic kidney disease     kidnsy stones    Psychiatric disorder       Past Surgical History:   Procedure Laterality Date    HX ORTHOPAEDIC      right wrist dislocated     Family History   Problem Relation Age of Onset    Atrial Fibrillation Mother     Diabetes Father     Heart Disease Father     Other Maternal Uncle         [employer]    Arthritis-rheumatoid Neg Hx      Social History     Socioeconomic History    Marital status: SINGLE     Spouse name: Not on file    Number of children: Not on file    Years of education: Not on file    Highest education level: Not on file   Social Needs    Financial resource strain: Not on file    Food insecurity - worry: Not on file    Food insecurity - inability: Not on file   [employer] needs - medical: Not on file   [employer] needs - non-medical: Not on file   Occupational History    Not on file   Tobacco Use    Smoking status: Current Every Day Smoker    Smokeless tobacco: Never Used   Substance and Sexual Activity    Alcohol use: Yes    Drug use: Not on file    Sexual activity: Not on file   Other Topics Concern    Not on file   Social History Narrative    Not on file         Allergies   Allergen Reactions    Sulfur Hives       Disposition     Follow-up Disposition:  Return in about 4 weeks (around 4/4/2019), or if symptoms worsen or fail to improve. No future appointments. Current Medications after this visit     Current Outpatient Medications   Medication Sig    diclofenac EC (VOLTAREN) 50 mg EC tablet Take 1 Tab by mouth two (2) times a day.  buPROPion (WELLBUTRIN) 100 mg tablet Take 1 Tab by mouth two (2) times a day.  topiramate (TOPAMAX) 100 mg tablet Take 1 Tab by mouth two (2) times a day. No current facility-administered medications for this visit.       Medications Discontinued During This Encounter   Medication Reason    raNITIdine (ZANTAC) 150 mg tablet Not A Current Medication

## 2019-03-07 NOTE — PATIENT INSTRUCTIONS
Flatfoot: Care Instructions  Your Care Instructions  A flatfoot means that the bottom of your foot does not have the usual arch. One or both of your feet may be flat. You may have inherited flatfeet. Having an injury, being very overweight, or having a condition such as rheumatoid arthritis, stroke, or diabetes also can cause the arch to flatten. Flatfoot usually is not a serious problem. But some people do have pain if they gain weight or stand a lot. You also can have pain when walking or running. You can do exercises and wear pads and roomy shoes to help support your feet. Follow-up care is a key part of your treatment and safety. Be sure to make and go to all appointments, and call your doctor if you are having problems. It's also a good idea to know your test results and keep a list of the medicines you take. How can you care for yourself at home? · Wear shoes with good arch support and lots of room in the toes. · Put heel padding (called a heel cup) or inserts (called orthotics) in your shoes to raise the heel. Orthotics are molded pieces of rubber, leather, or other material that can help cushion and balance your feet. · Try these exercises to stretch your feet and make them stronger, if your doctor says it is okay. ? Stretch your calf muscles: Stand about 1 foot from a wall and place the palms of both hands against the wall at chest level. Step back with one foot. Keep that leg straight at the knee, and keep both feet flat on the floor. Your feet should point at the wall or slightly toward the center of your body. Bend your front leg at the knee, and press the wall with both hands until you feel a gentle stretch in your back leg. Hold for at least 15 seconds. Increase to 30 seconds over time. Switch legs and repeat. Do this 2 to 4 times a day. ? Stretch your feet: Sit on the floor or a mat with your feet stretched in front of you.  Roll up a towel lengthwise and loop it around the ball of one foot. Hold one end of the towel in each hand and gently pull the towel toward your body. Hold for at least 15 to 30 seconds. Repeat with the other foot. Do this 2 to 4 times a day. ? Make your feet stronger: Place a towel on the floor. Sit in a chair in front of the towel with both feet flat on the towel at one end.  the towel with the toes of one foot while keeping the heel of that foot on the floor. (Use your other foot to anchor the towel). Curl your toes to pull the towel toward you. Repeat with the other foot. Do this 2 to 4 times a day. · Take anti-inflammatory medicines such as ibuprofen (Advil, Motrin) and naproxen (Aleve) to reduce pain if your feet or legs hurt. Read and follow all instructions on the label. · Try heat or massage on the area that is causing you pain. Use a heating pad set on low or a warm cloth. Put a thin cloth between the heating pad and your skin. When should you call for help? Watch closely for changes in your health, and be sure to contact your doctor if:    · You have pain in your feet or legs.     · You want help to find orthotics to fit your feet. Where can you learn more? Go to http://viri-jean.info/. Enter Q257 in the search box to learn more about \"Flatfoot: Care Instructions. \"  Current as of: September 20, 2018  Content Version: 11.9  © 1967-3294 [employer]. Care instructions adapted under license by [employer] (which disclaims liability or warranty for this information). If you have questions about a medical condition or this instruction, always ask your healthcare professional. [place] [address] any warranty or liability for your use of this information.

## 2019-03-08 ENCOUNTER — TELEPHONE (OUTPATIENT)
Dept: FAMILY MEDICINE CLINIC | Age: 29
End: 2019-03-08

## 2019-03-08 NOTE — TELEPHONE ENCOUNTER
I have received and reviewed your recent results. So far both your imflammatory markers are normal. The MELE testing is still pending. Your serum pregnancy test was negative.      Shania Hoyt MD

## 2019-03-10 LAB
ANA TITR SER IF: NEGATIVE {TITER}
CRP SERPL-MCNC: 2.9 MG/L (ref 0–4.9)
ERYTHROCYTE [SEDIMENTATION RATE] IN BLOOD BY WESTERGREN METHOD: 10 MM/HR (ref 0–32)
HCG INTACT+B SERPL-ACNC: <1 MIU/ML

## 2019-03-11 ENCOUNTER — TELEPHONE (OUTPATIENT)
Dept: FAMILY MEDICINE CLINIC | Age: 29
End: 2019-03-11

## 2019-03-11 DIAGNOSIS — M79.672 PAIN IN BOTH FEET: Primary | ICD-10-CM

## 2019-03-11 DIAGNOSIS — M79.671 PAIN IN BOTH FEET: Primary | ICD-10-CM

## 2019-03-11 RX ORDER — MELOXICAM 15 MG/1
15 TABLET ORAL DAILY
Qty: 30 TAB | Refills: 0 | Status: SHIPPED | OUTPATIENT
Start: 2019-03-11 | End: 2019-07-17

## 2019-03-11 NOTE — TELEPHONE ENCOUNTER
Pt called and was advised of result letter. She would like to know if something can be written for pain. Nothing that she is currently taking right now is helping.

## 2019-06-17 DIAGNOSIS — F41.9 ANXIETY AND DEPRESSION: ICD-10-CM

## 2019-06-17 DIAGNOSIS — F32.A ANXIETY AND DEPRESSION: ICD-10-CM

## 2019-06-18 NOTE — TELEPHONE ENCOUNTER
Last ordered 12-17-18 for one month  Last appt with Dr Basilio Lyn 3-7-19 for joint stiffness  Pt requesting a refill on buPROPion [facility])    Or possibly a higher dose    Informed pt that this will require an appointment  Pt requests message be sent to MD   She declines an appointment  Please advise

## 2019-06-21 RX ORDER — BUPROPION HYDROCHLORIDE 100 MG/1
100 TABLET ORAL 2 TIMES DAILY
Qty: 60 TAB | OUTPATIENT
Start: 2019-06-21

## 2019-06-21 NOTE — TELEPHONE ENCOUNTER
Patient needs appointment. Last visit was 6 months ago regarding her anxiety and she was provided a 1 month supply of wellbutrin at that time.

## 2019-06-24 NOTE — TELEPHONE ENCOUNTER
Spoke with pt   Offered an appt   Pt declined states she will go [facility]" for a refill  Suggested she call back if she would like an appt here

## 2019-07-17 ENCOUNTER — OFFICE VISIT (OUTPATIENT)
Dept: FAMILY MEDICINE CLINIC | Age: 29
End: 2019-07-17

## 2019-07-17 VITALS
WEIGHT: 213 LBS | HEIGHT: 64 IN | SYSTOLIC BLOOD PRESSURE: 113 MMHG | HEART RATE: 85 BPM | BODY MASS INDEX: 36.37 KG/M2 | TEMPERATURE: 99.1 F | OXYGEN SATURATION: 97 % | RESPIRATION RATE: 20 BRPM | DIASTOLIC BLOOD PRESSURE: 78 MMHG

## 2019-07-17 DIAGNOSIS — M79.672 PAIN IN BOTH FEET: ICD-10-CM

## 2019-07-17 DIAGNOSIS — M25.561 CHRONIC PAIN OF RIGHT KNEE: Primary | ICD-10-CM

## 2019-07-17 DIAGNOSIS — E66.01 SEVERE OBESITY (HCC): ICD-10-CM

## 2019-07-17 DIAGNOSIS — M25.60 MORNING JOINT STIFFNESS OF MULTIPLE SITES: ICD-10-CM

## 2019-07-17 DIAGNOSIS — M79.671 PAIN IN BOTH FEET: ICD-10-CM

## 2019-07-17 DIAGNOSIS — G89.29 CHRONIC PAIN OF RIGHT KNEE: Primary | ICD-10-CM

## 2019-07-17 DIAGNOSIS — Z72.0 TOBACCO ABUSE: ICD-10-CM

## 2019-07-17 RX ORDER — PREDNISONE 20 MG/1
20 TABLET ORAL 2 TIMES DAILY
Qty: 10 TAB | Refills: 0 | Status: SHIPPED | OUTPATIENT
Start: 2019-07-17 | End: 2021-08-18

## 2019-07-17 NOTE — PROGRESS NOTES
1. Have you been to the ER, urgent care clinic since your last visit? Hospitalized since your last visit? No    2. Have you seen or consulted any other health care providers outside of the [address] [place] [address] since your last visit? Include any pap smears or colon screening.  No  Reviewed record in preparation for visit and have necessary documentation  Pt did not bring medication to office visit for review    Goals that were addressed and/or need to be completed during or after this appointment include     Health Maintenance Due   Topic Date Due    Pneumococcal 0-64 years (1 of 1 - PPSV23) 11/25/1996    DTaP/Tdap/Td series (1 - Tdap) 11/25/2011    PAP AKA CERVICAL CYTOLOGY  11/25/2011

## 2019-07-19 ENCOUNTER — TELEPHONE (OUTPATIENT)
Dept: FAMILY MEDICINE CLINIC | Age: 29
End: 2019-07-19

## 2019-07-19 NOTE — TELEPHONE ENCOUNTER
Pt is calling because Dr. Pretty Irene wanted her to call back to let him know how the Prednisone was working for her. She sees some difference. But it is not gone. She and he talked on maybe needing a higher dosage. She is asking for a higher dosage to be sent to [facility]. If you need to call her please call [phone].

## 2019-07-22 PROBLEM — E66.01 SEVERE OBESITY (HCC): Status: ACTIVE | Noted: 2019-07-22

## 2019-07-22 RX ORDER — NABUMETONE 500 MG/1
500 TABLET, FILM COATED ORAL 2 TIMES DAILY
Qty: 60 TAB | Refills: 1 | Status: SHIPPED | OUTPATIENT
Start: 2019-07-22 | End: 2021-08-18

## 2019-07-22 RX ORDER — PREDNISONE 20 MG/1
TABLET ORAL
Qty: 10 TAB | Refills: 0 | OUTPATIENT
Start: 2019-07-22

## 2019-07-22 NOTE — TELEPHONE ENCOUNTER
Pt states that the prednisone is helping and would like to know If the same dosage could be sent to the pharmacy if Dr. Rafaela Alvarado doesn't want to increase.

## 2019-07-23 NOTE — PROGRESS NOTES
Patient: Justin Barros MRN: [id number]  SSN: [id number]    YOB: 1990  Age: 29 y.o. Sex: female        Subjective:     Chief Complaint   Patient presents with    Ankle Pain     bilateral, mostly right; feet up legs       HPI: she is a 29y.o. year old female new to me who presents with complaint of multiple arthralgias. Patient works as  and is morbidly obese. Recent ESR, MELE and CRP in normal range. Patient denies HA, dizziness, SOB, CP, abdominal pain, dysuria, weakness or paresthesia. Encounter Diagnoses   Name Primary?  Chronic pain of right knee Yes    Pain in both feet     Morning joint stiffness of multiple sites     Severe obesity (HCC)     Tobacco abuse        BP Readings from Last 3 Encounters:   07/17/19 113/78   03/07/19 131/69   12/17/18 106/71       Wt Readings from Last 3 Encounters:   07/17/19 213 lb (96.6 kg)   03/07/19 213 lb (96.6 kg)   12/17/18 216 lb (98 kg)     Body mass index is 36.56 kg/m². Current and past medical information:    Current Medications after this visit[de-identified]     Current Outpatient Medications   Medication Sig    predniSONE (DELTASONE) 20 mg tablet Take 20 mg by mouth two (2) times a day.  buPROPion (WELLBUTRIN) 100 mg tablet Take 1 Tab by mouth two (2) times a day.  topiramate (TOPAMAX) 100 mg tablet Take 1 Tab by mouth two (2) times a day.  nabumetone (RELAFEN) 500 mg tablet Take 1 Tab by mouth two (2) times a day. No current facility-administered medications for this visit.         Patient Active Problem List    Diagnosis Date Noted    Severe obesity ([facility] [address] 07/22/2019       Past Medical History:   Diagnosis Date    Chronic kidney disease     kidnsy stones    Psychiatric disorder        Allergies   Allergen Reactions    Sulfur Hives       Past Surgical History:   Procedure Laterality Date    HX ORTHOPAEDIC      right wrist dislocated       Social History     Socioeconomic History    Marital status: SINGLE     Spouse name: Not on file    Number of children: Not on file    Years of education: Not on file    Highest education level: Not on file   Tobacco Use    Smoking status: Current Every Day Smoker    Smokeless tobacco: Never Used   Substance and Sexual Activity    Alcohol use: Yes         Objective:     Review of Systems:  Constitutional: Negative for fatigue or malaise  Derm: Negative for rash or lesion  HEENT: Negative for acute hearing or vision changes  Cardiovascular: Negative for dizziness, chest pain or palpitations  Respiratory: Negative for cough, wheezing or SOB  Gastrointestinal: Negative for nausea or abdominal pain  Genital/urinary: Negative for dysuria or voiding dysfunction  Musculoskeletal: Negative for acute myalgias or arthralgias   Neurological: Negative for headache, weakness or paresthesia  Psychological: Negative for depression or anxiety      Vitals:    07/17/19 1556   BP: 113/78   Pulse: 85   Resp: 20   Temp: 99.1 °F (37.3 °C)   TempSrc: Oral   SpO2: 97%   Weight: 213 lb (96.6 kg)   Height: 5' 4\" (1.626 m)      Body mass index is 36.56 kg/m². Physical Exam:  Constitutional: well developed, well nourished, in no acute distress  Skin: warm and dry, normal tone and turgor  Head: normocephalic, atraumatic  Eyes: sclera clear, EOMI  Neck: normal range of motion  Cardiovascular: normal S1, S2, regular rate and rhythm  Respiratory: clear to auscultation bilaterally with symmetrical effort  Extremities: full range of motion, normal gait  Neurology: normal strength and sensation  Psych: active, alert and oriented, affect appropriate       Assessment and orders:       ICD-10-CM ICD-9-CM    1. Chronic pain of right knee M25.561 719.46 XR KNEE RT MAX 2 VWS    G89.29 338.29 predniSONE (DELTASONE) 20 mg tablet   2. Pain in both feet M79.671 729. 5 predniSONE (DELTASONE) 20 mg tablet    M79.672     3. Morning joint stiffness of multiple sites M25.60 719.59    4. Severe obesity ([address] E66.01 278.01    5.  Tobacco abuse Z72.0 305.1          Plan of care:  Diagnoses were discussed in detail with patient. Medication risks/benefits/side effects discussed with patient. All of the patient's questions were addressed and answered to apparent satisfaction. The patient understands and agrees with our plan of care. The patient knows to call back if they have questions about the plan of care or if symptoms change. The patient received an After-Visit Summary which contains VS, diagnoses, orders, allergy and medication lists. Patient Care Team:  Mitzi Byrd MD as PCP - Humphrey Foley DPM (Podiatry)        No future appointments.     Signed By: Adwoa Kee MD     July 22, 2019

## 2019-07-23 NOTE — TELEPHONE ENCOUNTER
Patient states she has finished the prednisone and while on the medication she was seeing an improvement in her pain. As soon as she finished it, the pain was back again. She said she is unable to work with this pain. I advised patient Prednisone is a short term medication per Dr. Lubin and a new RX was sent to pharmacy. Patient is unwilling to try new med and is requesting further treatment with Prednisone. Please advise.

## 2019-07-23 NOTE — PATIENT INSTRUCTIONS
Joint Pain: Care Instructions  Your Care Instructions    Many people have small aches and pains from overuse or injury to muscles and joints. Joint injuries often happen during sports or recreation, work tasks, or projects around the home. An overuse injury can happen when you put too much stress on a joint or when you do an activity that stresses the joint over and over, such as using the computer or rowing a boat. You can take action at home to help your muscles and joints get better. You should feel better in 1 to 2 weeks, but it can take 3 months or more to heal completely. Follow-up care is a key part of your treatment and safety. Be sure to make and go to all appointments, and call your doctor if you are having problems. It's also a good idea to know your test results and keep a list of the medicines you take. How can you care for yourself at home? · Do not put weight on the injured joint for at least a day or two. · For the first day or two after an injury, do not take hot showers or baths, and do not use hot packs. The heat could make swelling worse. · Put ice or a cold pack on the sore joint for 10 to 20 minutes at a time. Try to do this every 1 to 2 hours for the next 3 days (when you are awake) or until the swelling goes down. Put a thin cloth between the ice and your skin. · Wrap the injury in an elastic bandage. Do not wrap it too tightly because this can cause more swelling. · Prop up the sore joint on a pillow when you ice it or anytime you sit or lie down during the next 3 days. Try to keep it above the level of your heart. This will help reduce swelling. · Take an over-the-counter pain medicine, such as acetaminophen (Tylenol), ibuprofen (Advil, Motrin), or naproxen (Aleve). Read and follow all instructions on the label. · After 1 or 2 days of rest, begin moving the joint gently.  While the joint is still healing, you can begin to exercise using activities that do not strain or hurt the painful joint. When should you call for help? Call your doctor now or seek immediate medical care if:    · You have signs of infection, such as:  ? Increased pain, swelling, warmth, and redness. ? Red streaks leading from the joint. ? A fever.    Watch closely for changes in your health, and be sure to contact your doctor if:    · Your movement or symptoms are not getting better after 1 to 2 weeks of home treatment. Where can you learn more? Go to http://viri-jean.info/. Enter P205 in the search box to learn more about \"Joint Pain: Care Instructions. \"  Current as of: September 20, 2018  Content Version: 12.1  © 8872-4529 [employer]. Care instructions adapted under license by [employer] (which disclaims liability or warranty for this information). If you have questions about a medical condition or this instruction, always ask your healthcare professional. [address] any warranty or liability for your use of this information.

## 2019-07-26 ENCOUNTER — TELEPHONE (OUTPATIENT)
Dept: FAMILY MEDICINE CLINIC | Age: 29
End: 2019-07-26

## 2019-07-26 NOTE — TELEPHONE ENCOUNTER
Patient called and stated that the medication is not helping. Please call to advise at [phone]. Patient is having a lot of pain.

## 2019-07-26 NOTE — TELEPHONE ENCOUNTER
Xrays of feet and knees have not shown DJD. To date, lab work has not identified any underlying condition. Prednisone is a potent anti-inflammatory. However its use is limited to short courses of treatment. Few medical options remain.

## 2019-08-14 DIAGNOSIS — G43.909 MIGRAINE WITHOUT STATUS MIGRAINOSUS, NOT INTRACTABLE, UNSPECIFIED MIGRAINE TYPE: ICD-10-CM

## 2019-08-14 DIAGNOSIS — F32.A ANXIETY AND DEPRESSION: ICD-10-CM

## 2019-08-14 DIAGNOSIS — F41.9 ANXIETY AND DEPRESSION: ICD-10-CM

## 2019-08-14 NOTE — TELEPHONE ENCOUNTER
Caller (if not patient): Patient       Best contact number(s): [phone]       Name of medication and dosage if known: \"Bupropion\" and \"Toprimate\"       Is patient out of this medication (yes/no):  Yes       Pharmacy name: [address] [place] [address]

## 2019-08-20 RX ORDER — BUPROPION HYDROCHLORIDE 100 MG/1
100 TABLET ORAL 2 TIMES DAILY
Qty: 60 TAB | Refills: 0 | Status: SHIPPED | OUTPATIENT
Start: 2019-08-20 | End: 2020-05-29

## 2019-08-20 RX ORDER — TOPIRAMATE 100 MG/1
100 TABLET, FILM COATED ORAL 2 TIMES DAILY
Qty: 60 TAB | Refills: 0 | Status: SHIPPED | OUTPATIENT
Start: 2019-08-20 | End: 2021-08-18 | Stop reason: SDUPTHER

## 2020-05-26 DIAGNOSIS — F32.A ANXIETY AND DEPRESSION: ICD-10-CM

## 2020-05-26 DIAGNOSIS — F41.9 ANXIETY AND DEPRESSION: ICD-10-CM

## 2020-05-26 NOTE — LETTER
5/29/2020 4:48 PM 
 
Ms. Shayy Dumont 56 Dear Ms. Amaya: 
 
We've missed you! Please call our office at [phone] and schedule a follow up appointment for your continued care. Appointment required prior to further refills Sincerely, PORTILLO GARCÍA & LEIGH LAMAS [facility] [place]

## 2020-05-29 RX ORDER — BUPROPION HYDROCHLORIDE 100 MG/1
TABLET ORAL
Qty: 30 TAB | Refills: 0 | Status: SHIPPED | OUTPATIENT
Start: 2020-05-29 | End: 2021-08-18 | Stop reason: SDUPTHER

## 2020-08-15 ENCOUNTER — APPOINTMENT (OUTPATIENT)
Dept: GENERAL RADIOLOGY | Age: 30
End: 2020-08-15
Attending: EMERGENCY MEDICINE

## 2020-08-15 ENCOUNTER — HOSPITAL ENCOUNTER (EMERGENCY)
Age: 30
Discharge: HOME | End: 2020-08-15
Attending: EMERGENCY MEDICINE | Admitting: EMERGENCY MEDICINE

## 2020-08-15 VITALS
SYSTOLIC BLOOD PRESSURE: 127 MMHG | OXYGEN SATURATION: 97 % | WEIGHT: 220 LBS | HEIGHT: 65 IN | TEMPERATURE: 98.3 F | BODY MASS INDEX: 36.65 KG/M2 | DIASTOLIC BLOOD PRESSURE: 83 MMHG | RESPIRATION RATE: 18 BRPM | HEART RATE: 98 BPM

## 2020-08-15 DIAGNOSIS — F32.A ANXIETY AND DEPRESSION: ICD-10-CM

## 2020-08-15 DIAGNOSIS — F41.9 ANXIETY AND DEPRESSION: ICD-10-CM

## 2020-08-15 DIAGNOSIS — S93.401A SPRAIN OF RIGHT ANKLE, UNSPECIFIED LIGAMENT, INITIAL ENCOUNTER: Primary | ICD-10-CM

## 2020-08-15 PROCEDURE — 99283 EMERGENCY DEPT VISIT LOW MDM: CPT

## 2020-08-15 PROCEDURE — 74011250637 HC RX REV CODE- 250/637: Performed by: EMERGENCY MEDICINE

## 2020-08-15 PROCEDURE — 73610 X-RAY EXAM OF ANKLE: CPT

## 2020-08-15 RX ORDER — TRAMADOL HYDROCHLORIDE 50 MG/1
50 TABLET ORAL
Qty: 8 TAB | Refills: 0 | Status: SHIPPED | OUTPATIENT
Start: 2020-08-15 | End: 2020-08-25

## 2020-08-15 RX ORDER — NAPROXEN 250 MG/1
500 TABLET ORAL
Status: COMPLETED | OUTPATIENT
Start: 2020-08-15 | End: 2020-08-15

## 2020-08-15 RX ORDER — TRAMADOL HYDROCHLORIDE 50 MG/1
50 TABLET ORAL
Qty: 8 TAB | Refills: 0 | Status: SHIPPED | OUTPATIENT
Start: 2020-08-15 | End: 2020-08-15

## 2020-08-15 RX ADMIN — NAPROXEN 500 MG: 250 TABLET ORAL at 01:36

## 2020-08-15 NOTE — DISCHARGE INSTRUCTIONS
Patient Education        Ankle Sprain: Care Instructions  Your Care Instructions     An ankle sprain can happen when you twist your ankle. The ligaments that support the ankle can get stretched and torn. Often the ankle is swollen and painful. Ankle sprains may take from several weeks to several months to heal. Usually, the more pain and swelling you have, the more severe your ankle sprain is and the longer it will take to heal. You can heal faster and regain strength in your ankle with good home treatment. It is very important to give your ankle time to heal completely, so that you do not easily hurt your ankle again. Follow-up care is a key part of your treatment and safety. Be sure to make and go to all appointments, and call your doctor if you are having problems. It's also a good idea to know your test results and keep a list of the medicines you take. How can you care for yourself at home? · Prop up your foot on pillows as much as possible for the next 3 days. Try to keep your ankle above the level of your heart. This will help reduce the swelling. · Follow your doctor's directions for wearing a splint or elastic bandage. Wrapping the ankle may help reduce or prevent swelling. · Your doctor may give you a splint, a brace, an air stirrup, or another form of ankle support to protect your ankle until it is healed. Wear it as directed while your ankle is healing. Do not remove it unless your doctor tells you to. After your ankle has healed, ask your doctor whether you should wear the brace when you exercise. · Put ice or cold packs on your injured ankle for 10 to 20 minutes at a time. Try to do this every 1 to 2 hours for the next 3 days (when you are awake) or until the swelling goes down. Put a thin cloth between the ice and your skin. · You may need to use crutches until you can walk without pain. If you do use crutches, try to bear some weight on your injured ankle if you can do so without pain.  This helps the ankle heal.  · Take pain medicines exactly as directed. ? If the doctor gave you a prescription medicine for pain, take it as prescribed. ? If you are not taking a prescription pain medicine, ask your doctor if you can take an over-the-counter medicine. · If you have been given ankle exercises to do at home, do them exactly as instructed. These can promote healing and help prevent lasting weakness. When should you call for help? Call your doctor now or seek immediate medical care if:  · Your pain is getting worse. · Your swelling is getting worse. · Your splint feels too tight or you are unable to loosen it. Watch closely for changes in your health, and be sure to contact your doctor if:  · You are not getting better after 1 week. Where can you learn more? Go to http://viri-jean.info/  Enter D667 in the search box to learn more about \"Ankle Sprain: Care Instructions. \"  Current as of: March 2, 2020               Content Version: 12.5  © 1616-4119 [employer]. Care instructions adapted under license by [employer] (which disclaims liability or warranty for this information). If you have questions about a medical condition or this instruction, always ask your healthcare professional. [address] any warranty or liability for your use of this information. We hope that we have addressed all of your medical concerns. The examination and treatment you received in the Emergency Department were for an emergent problem and were not intended as complete care. It is important that you follow up with your healthcare provider(s) for ongoing care. If your symptoms worsen or do not improve as expected, and you are unable to reach your usual health care provider(s), you should return to the Emergency Department.       Today's healthcare is undergoing tremendous change, and patient satisfaction surveys are one of the many tools to assess the quality of medical care. You may receive a survey from the [employer] regarding your experience in the Emergency Department. I hope that your experience has been completely positive, particularly the medical care that I provided. As such, please participate in the survey; anything less than excellent does not meet my expectations or intentions. [facility] and [facility] participate in nationally recognized quality of care measures. If your blood pressure is greater than 120/80, as reported below, we urge that you seek medical care to address the potential of high blood pressure, commonly known as hypertension. Hypertension can be hereditary or can be caused by certain medical conditions, pain, stress, or \"white coat syndrome. \"       Please make an appointment with your health care provider(s) for follow up of your Emergency Department visit. VITALS:   Patient Vitals for the past 8 hrs:   Temp Pulse Resp BP SpO2   08/15/20 0111 98.3 °F (36.8 °C) 98 18 127/83 97 %          Thank you for allowing us to provide you with medical care today. We realize that you have many choices for your emergency care needs. Please choose us in the future for any continued health care needs. Jackie James, [address]: [phone]            No results found for this or any previous visit (from the past 24 hour(s)). Xr Ankle Rt Min 3 V    Result Date: 8/15/2020  EXAM: XR ANKLE RT MIN 3 V INDICATION: pain lateral ankle. COMPARISON: None. FINDINGS: Three views of the right ankle demonstrate no fracture or disruption of the ankle mortise. There is no other acute osseous or articular abnormality. There is lateral soft tissue swelling. IMPRESSION: No acute osseous or articular abnormality. Lateral soft tissue swelling.

## 2020-08-15 NOTE — ED PROVIDER NOTES
HPI   35 yo F presents with right ankle pain. Had recent avulsion fracture to right ankle. Recently had boot (after 3 weeks) removed Wednesday. Went back to work Tuesday. Stepped into mud puddle tonight and felt a pop to right ankle, lateral ankle. Pain 9/10, nonradiating, denies weakness/numbness. Is unable to bear weight without pain. Followed by Dr. Aracelis Mayes. Took tylenol PTA in ED. LMP-last month, denies pregnancy  Past Medical History:   Diagnosis Date    Chronic kidney disease     kidnsy stones    Psychiatric disorder        Past Surgical History:   Procedure Laterality Date    HX ORTHOPAEDIC      right wrist dislocated         Family History:   Problem Relation Age of Onset    Atrial Fibrillation Mother     Diabetes Father     Heart Disease Father     Other Maternal Uncle         [employer]    Arthritis-rheumatoid Neg Hx        Social History     Socioeconomic History    Marital status: SINGLE     Spouse name: Not on file    Number of children: Not on file    Years of education: Not on file    Highest education level: Not on file   Occupational History    Not on file   Social Needs    Financial resource strain: Not on file    Food insecurity     Worry: Not on file     Inability: Not on file    Transportation needs     Medical: Not on file     Non-medical: Not on file   Tobacco Use    Smoking status: Current Every Day Smoker    Smokeless tobacco: Never Used   Substance and Sexual Activity    Alcohol use:  Yes    Drug use: Not on file    Sexual activity: Not on file   Lifestyle    Physical activity     Days per week: Not on file     Minutes per session: Not on file    Stress: Not on file   Relationships    Social connections     Talks on phone: Not on file     Gets together: Not on file     Attends [employer] service: Not on file     Active member of club or organization: Not on file     Attends meetings of clubs or organizations: Not on file     Relationship status: Not on file   Kristine Intimate partner violence     Fear of current or ex partner: Not on file     Emotionally abused: Not on file     Physically abused: Not on file     Forced sexual activity: Not on file   Other Topics Concern    Not on file   Social History Narrative    Not on file         ALLERGIES: Sulfur    Review of Systems   Constitutional: Negative for chills and fever. Respiratory: Negative for cough and shortness of breath. Cardiovascular: Negative for chest pain. Gastrointestinal: Negative for abdominal pain. Musculoskeletal: Positive for arthralgias. Skin: Negative for rash and wound. Neurological: Negative for weakness, numbness and headaches. All other systems reviewed and are negative. There were no vitals filed for this visit.          Physical Exam   Physical Examination: General appearance - alert, well appearing, and in no distress, oriented to person, place, and time and normal appearing weight  Eyes - pupils equal and reactive, extraocular eye movements intact  Neck - supple, no significant adenopathy  Chest - clear to auscultation, no wheezes, rales or rhonchi, symmetric air entry  Heart - normal rate, regular rhythm, normal S1, S2, no murmurs, rubs, clicks or gallops  Abdomen - soft, nontender, nondistended, no masses or organomegaly  Back exam - full range of motion, no tenderness, palpable spasm or pain on motion  Neurological - alert, oriented, normal speech, no focal findings or movement disorder noted  Musculoskeletal -swelling to right lateral ankle with tenderness, mild medial tenderness to right ankle, strong pedal pulses neurovascularly intact, no pain to the right knee, full range of motion of right knee without pain or tenderness  Extremities - peripheral pulses normal, no pedal edema, no clubbing or cyanosis  Skin - normal coloration and turgor, no rashes, no suspicious skin lesions noted  MDM  Number of Diagnoses or Management Options  Sprain of right ankle, unspecified ligament, initial encounter:      Amount and/or Complexity of Data Reviewed  Tests in the radiology section of CPT®: ordered and reviewed  Decide to obtain previous medical records or to obtain history from someone other than the patient: yes  Review and summarize past medical records: yes  Independent visualization of images, tracings, or specimens: yes    Patient Progress  Patient progress: stable         Procedures  No acute fracture on x-ray. Offered to place patient in an ankle Aircast splint but patient refused given she already has a splint and boot at home. She also has crutches at home. She states she will place them on her right ankle when she arrives home and will follow up with Dr. Silas Saldaña for further evaluation and treatment. Vital signs are stable. Weightbearing as tolerated.

## 2020-08-15 NOTE — ED TRIAGE NOTES
Pt to triage with mask, c/o R ankle pain after rolling ankle tonight, reports hearing snap. States recent ankle fx of the same ankle, recently got out of boot. Took tylenol approx 1 hour ago without relief. Swelling noted in triage.

## 2020-08-15 NOTE — LETTER
[address] 
[facility] EMERGENCY DEPT 
[address] [facility] [id number] [phone] Work/School Note Date: 8/15/2020 To Whom It May concern: 
 
Matthieu Fortune was seen and treated today in the emergency room by the following provider(s): 
Attending Provider: Marcie Scheuermann, MD. Matthieu Fortune may return to work on 8/19/2020. Sincerely, Lexi Yun RN

## 2020-08-21 RX ORDER — BUPROPION HYDROCHLORIDE 100 MG/1
TABLET ORAL
Qty: 30 TAB | Refills: 0 | OUTPATIENT
Start: 2020-08-21

## 2021-08-18 ENCOUNTER — OFFICE VISIT (OUTPATIENT)
Dept: FAMILY MEDICINE CLINIC | Age: 31
End: 2021-08-18
Payer: COMMERCIAL

## 2021-08-18 VITALS
SYSTOLIC BLOOD PRESSURE: 123 MMHG | BODY MASS INDEX: 41.66 KG/M2 | DIASTOLIC BLOOD PRESSURE: 83 MMHG | WEIGHT: 244 LBS | RESPIRATION RATE: 20 BRPM | HEART RATE: 93 BPM | OXYGEN SATURATION: 95 % | HEIGHT: 64 IN | TEMPERATURE: 98.8 F

## 2021-08-18 DIAGNOSIS — Z13.31 POSITIVE DEPRESSION SCREENING: ICD-10-CM

## 2021-08-18 DIAGNOSIS — F41.9 ANXIETY AND DEPRESSION: Primary | ICD-10-CM

## 2021-08-18 DIAGNOSIS — Z72.0 TOBACCO ABUSE: ICD-10-CM

## 2021-08-18 DIAGNOSIS — L21.9 SEBORRHEIC DERMATITIS OF SCALP: ICD-10-CM

## 2021-08-18 DIAGNOSIS — G43.909 MIGRAINE WITHOUT STATUS MIGRAINOSUS, NOT INTRACTABLE, UNSPECIFIED MIGRAINE TYPE: ICD-10-CM

## 2021-08-18 DIAGNOSIS — F32.A ANXIETY AND DEPRESSION: Primary | ICD-10-CM

## 2021-08-18 PROCEDURE — 99214 OFFICE O/P EST MOD 30 MIN: CPT | Performed by: FAMILY MEDICINE

## 2021-08-18 RX ORDER — BUPROPION HYDROCHLORIDE 100 MG/1
100 TABLET ORAL 2 TIMES DAILY
Qty: 180 TABLET | Refills: 0 | Status: SHIPPED | OUTPATIENT
Start: 2021-08-18 | End: 2022-10-07 | Stop reason: SDUPTHER

## 2021-08-18 RX ORDER — TOPIRAMATE 50 MG/1
50 TABLET, FILM COATED ORAL 2 TIMES DAILY
Qty: 60 TABLET | Refills: 0 | Status: SHIPPED | OUTPATIENT
Start: 2021-08-18 | End: 2022-10-07 | Stop reason: SDUPTHER

## 2021-08-18 RX ORDER — KETOCONAZOLE 20 MG/ML
SHAMPOO TOPICAL
Qty: 120 ML | Refills: 1 | Status: SHIPPED | OUTPATIENT
Start: 2021-08-18 | End: 2021-12-20

## 2021-08-18 RX ORDER — BETAMETHASONE VALERATE 1.2 MG/G
AEROSOL, FOAM TOPICAL 2 TIMES DAILY
Qty: 50 G | Refills: 2 | Status: SHIPPED | OUTPATIENT
Start: 2021-08-18 | End: 2022-10-07 | Stop reason: SDUPTHER

## 2021-08-18 NOTE — PATIENT INSTRUCTIONS
Take 1/2 tab twice daily for 1 week. Then increase to 1 tab twice daily for a week. Then increase to 2 tabs twice daily.

## 2021-08-18 NOTE — PROGRESS NOTES
1. Have you been to the ER, urgent care clinic since your last visit? Hospitalized since your last visit? No    2. Have you seen or consulted any other health care providers outside of the [address] [place] [address] since your last visit? Include any pap smears or colon screening. Yes When: Ortho - surgery- Dr. Jimmie Rogers - will request     Reviewed record in preparation for visit and have necessary documentation  Goals that were addressed and/or need to be completed during or after this appointment include     Health Maintenance Due   Topic Date Due    Hepatitis C Screening  Never done    Pneumococcal 0-64 years (1 of 2 - PPSV23) Never done    COVID-19 Vaccine (1) Never done    DTaP/Tdap/Td series (1 - Tdap) Never done    PAP AKA CERVICAL CYTOLOGY  Never done       Patient is accompanied by self I have received verbal consent from [facility] to discuss any/all medical information while they are present in the room.

## 2021-08-23 NOTE — PROGRESS NOTES
Progress Note    Patient: Alen Pride MRN: [id number]  SSN: [id number]    YOB: 1990  Age: 27 y.o. Sex: female        Chief Complaint   Patient presents with    Medication Refill    Skin Problem     she is a 27y.o. year old female who presents for medication refills. She is ready to restart medication for depression. Denies wanting to harm self or others. Has been on bupropion previously. Also needs refill of medication for migraine. She has erythematous patches on scalp. Says prior medications ineffective. Encounter Diagnoses   Name Primary?  Anxiety and depression Yes    Migraine without status migrainosus, not intractable, unspecified migraine type     Seborrheic dermatitis of scalp     Tobacco abuse        Patient Active Problem List   Diagnosis Code    [facility] E66.01     Past Surgical History:   Procedure Laterality Date    HX ORTHOPAEDIC      right wrist dislocated     Social History     Socioeconomic History    Marital status: SINGLE     Spouse name: Not on file    Number of children: Not on file    Years of education: Not on file    Highest education level: Not on file   Occupational History    Not on file   Tobacco Use    Smoking status: Current Every Day Smoker    Smokeless tobacco: Never Used   Substance and Sexual Activity    Alcohol use: Yes    Drug use: Not on file    Sexual activity: Not on file   Other Topics Concern    Not on file   Social History Narrative    Not on file     Social Determinants of Health     Financial Resource Strain:     Difficulty of Paying Living Expenses:    Food Insecurity:     Worried About Running Out of Food in the Last Year:     [address] in the Last Year:    Transportation Needs:     Lack of Transportation (Medical):      Lack of Transportation (Non-Medical):    Physical Activity:     Days of Exercise per Week:     Minutes of Exercise per Session:    Stress:     Feeling of Stress :    Social Connections:     Frequency of Communication with Friends and Family:     Frequency of Social Gatherings with Friends and Family:     Attends Yazdanism Services:     Active Member of Clubs or Organizations:     Attends Club or Organization Meetings:     Marital Status:    Intimate Partner Violence:     Fear of Current or Ex-Partner:     Emotionally Abused:     Physically Abused:     Sexually Abused:      Family History   Problem Relation Age of Onset    Atrial Fibrillation Mother     Diabetes Father     Heart Disease Father     Other Maternal Uncle         Goodpastures    Arthritis-rheumatoid Neg Hx      Current Outpatient Medications   Medication Sig    topiramate (TOPAMAX) 50 mg tablet Take 1 Tablet by mouth two (2) times a day.  buPROPion (WELLBUTRIN) 100 mg tablet Take 1 Tablet by mouth two (2) times a day.  ketoconazole (NIZORAL) 2 % shampoo Apply to scalp twice weekly for eight weeks.  betamethasone valerate (LUXIQ) 0.12 % topical foam Apply  to affected area two (2) times a day. No current facility-administered medications for this visit.      Allergies   Allergen Reactions    Sulfur Hives       Review of Systems:  Constitutional: Negative for fatigue, malaise  Resp: Negative for cough, wheezing or SOB  CV: Negative for chest pain, dizziness or palpitations  GI: Negative for nausea or abdominal pain  MS: Negative for acute myalgias or arthralgias   Neuro: Negative for HA, weakness or paresthesia  Psych: Negative for depression or anxiety     Vitals:    08/18/21 1552   BP: 123/83   Pulse: 93   Resp: 20   Temp: 98.8 °F (37.1 °C)   TempSrc: Oral   SpO2: 95%   Weight: 244 lb (110.7 kg)   Height: 5' 4\" (1.626 m)       Physical Examination:  General: Well developed, well nourished, in no acute distress  Head: Normocephalic, atraumatic  Eyes: Sclera clear, EOMI  Neck: Normal range of motion  Respiratory: symmetrical, unlabored effort  Cardiovascular: Regular rate and rhythm  Extremities: Full range of motion, normal gait  Neurologic: No focal deficits  Psych: Active, alert and oriented. Affect appropriate       ICD-10-CM ICD-9-CM    1. Anxiety and depression  F41.9 300.00 buPROPion (WELLBUTRIN) 100 mg tablet    F32.9 311    2. Migraine without status migrainosus, not intractable, unspecified migraine type  G43.909 346.90 topiramate (TOPAMAX) 50 mg tablet   3. Seborrheic dermatitis of scalp  L21.9 690.18 ketoconazole (NIZORAL) 2 % shampoo      betamethasone valerate (LUXIQ) 0.12 % topical foam   4. Tobacco abuse  Z72.0 305.1        Plan of care:  Diagnoses were discussed in detail with patient. Reviewed concept of anxiety and depression as biochemical imbalance of neurotransmitters and rationale for treatment. Instructed patient to contact office or on-call physician promptly should condition worsen or any new symptoms appear and provided on-call telephone numbers. IF THE PATIENT HAS ANY SUICIDAL OR HOMICIDAL IDEATION, CALL THE OFFICE, DISCUSS WITH A SUPPORT MEMBER OR GO TO THE ER IMMEDIATELY. Patient was agreeable with this plan  Medication risks/benefits/side effects discussed with patient. Importance of compliance with all prescribed medications discussed. Including any new medications prescribed today. All of the patient's questions were addressed and answered to apparent satisfaction. The patient understands and agrees with our plan of care. The patient knows to call back if they have questions about the plan of care or if symptoms change. The patient received an After-Visit Summary which contains VS, diagnoses, orders, allergy and medication lists. No future appointments. Follow-up and Dispositions    · Return in about 6 weeks (around 9/29/2021), or if symptoms worsen or fail to improve.

## 2022-03-20 PROBLEM — E66.01 SEVERE OBESITY (HCC): Status: ACTIVE | Noted: 2019-07-22

## 2022-08-17 ENCOUNTER — TELEPHONE (OUTPATIENT)
Dept: FAMILY MEDICINE CLINIC | Age: 32
End: 2022-08-17

## 2022-10-07 ENCOUNTER — OFFICE VISIT (OUTPATIENT)
Dept: FAMILY MEDICINE CLINIC | Age: 32
End: 2022-10-07
Payer: COMMERCIAL

## 2022-10-07 VITALS
WEIGHT: 242.6 LBS | TEMPERATURE: 97.8 F | HEIGHT: 64 IN | OXYGEN SATURATION: 97 % | RESPIRATION RATE: 18 BRPM | DIASTOLIC BLOOD PRESSURE: 98 MMHG | SYSTOLIC BLOOD PRESSURE: 140 MMHG | BODY MASS INDEX: 41.42 KG/M2 | HEART RATE: 104 BPM

## 2022-10-07 DIAGNOSIS — F32.A ANXIETY AND DEPRESSION: ICD-10-CM

## 2022-10-07 DIAGNOSIS — R53.83 FATIGUE, UNSPECIFIED TYPE: ICD-10-CM

## 2022-10-07 DIAGNOSIS — E66.01 SEVERE OBESITY (HCC): Primary | ICD-10-CM

## 2022-10-07 DIAGNOSIS — F41.9 ANXIETY AND DEPRESSION: ICD-10-CM

## 2022-10-07 DIAGNOSIS — G43.009 MIGRAINE WITHOUT AURA AND WITHOUT STATUS MIGRAINOSUS, NOT INTRACTABLE: Chronic | ICD-10-CM

## 2022-10-07 DIAGNOSIS — Z13.220 LIPID SCREENING: ICD-10-CM

## 2022-10-07 DIAGNOSIS — Z83.3 FAMILY HISTORY OF DIABETES MELLITUS: ICD-10-CM

## 2022-10-07 DIAGNOSIS — L21.9 SEBORRHEIC DERMATITIS OF SCALP: ICD-10-CM

## 2022-10-07 DIAGNOSIS — G43.909 MIGRAINE WITHOUT STATUS MIGRAINOSUS, NOT INTRACTABLE, UNSPECIFIED MIGRAINE TYPE: ICD-10-CM

## 2022-10-07 PROCEDURE — 99214 OFFICE O/P EST MOD 30 MIN: CPT | Performed by: FAMILY MEDICINE

## 2022-10-07 RX ORDER — TOPIRAMATE 50 MG/1
50 TABLET, FILM COATED ORAL 2 TIMES DAILY
Qty: 180 TABLET | Refills: 1 | Status: SHIPPED | OUTPATIENT
Start: 2022-10-07

## 2022-10-07 RX ORDER — BETAMETHASONE VALERATE 1.2 MG/G
AEROSOL, FOAM TOPICAL 2 TIMES DAILY
Qty: 50 G | Refills: 2 | Status: SHIPPED | OUTPATIENT
Start: 2022-10-07

## 2022-10-07 RX ORDER — BUPROPION HYDROCHLORIDE 100 MG/1
100 TABLET ORAL 2 TIMES DAILY
Qty: 180 TABLET | Refills: 0 | Status: SHIPPED | OUTPATIENT
Start: 2022-10-07

## 2022-10-07 NOTE — PROGRESS NOTES
[facility]    History of Present Illness:   Elisabet Rachel is a 32 y.o. female with history of Obesity, Migraines, Anxiety and depression  CC: Follow up  History provided by patient and Records    HPI:  Obesity:   Duration of problem:    Peceived Ideal weight: 170's   Highest weight: 242 lb   Lowest Adult weight: 170's   Failed diet plans: None   Current diet plan: None, Has tried Wellbutrin/Topamax     Wt Readings from Last 3 Encounters:   10/07/22 242 lb 9.6 oz (110 kg)   08/18/21 244 lb (110.7 kg)   08/15/20 220 lb (99.8 kg)                 has not lost weight   Body mass index is 41.64 kg/m². Seborrheic Dermatitis Patient noting scaling and flaking. Improved previously on Steroid cream/lotion. Fatigue: Persistent issue, and noting weight gain as well. Health Maintenance  Health Maintenance Due   Topic Date Due    Hepatitis C Screening  Never done    COVID-19 Vaccine (1) Never done    Pneumococcal 0-64 years (1 - PCV) Never done    DTaP/Tdap/Td series (1 - Tdap) Never done    Cervical cancer screen  Never done    Flu Vaccine (1) 08/01/2022    Depression Monitoring  08/19/2022       Past Medical, Family, and Social History:     Current Outpatient Medications on File Prior to Visit   Medication Sig Dispense Refill    ketoconazole (NIZORAL) 2 % shampoo APPLY TO SCALP TWICE WEEKLY FOR 8 WEEKS (Patient not taking: Reported on 10/7/2022) 120 mL 0    [DISCONTINUED] topiramate (TOPAMAX) 50 mg tablet Take 1 Tablet by mouth two (2) times a day. (Patient not taking: Reported on 10/7/2022) 60 Tablet 0    [DISCONTINUED] buPROPion (WELLBUTRIN) 100 mg tablet Take 1 Tablet by mouth two (2) times a day. (Patient not taking: Reported on 10/7/2022) 180 Tablet 0    [DISCONTINUED] betamethasone valerate (LUXIQ) 0.12 % topical foam Apply  to affected area two (2) times a day. (Patient not taking: Reported on 10/7/2022) 50 g 2     No current facility-administered medications on file prior to visit. Patient Active Problem List   Diagnosis Code    Severe obesity ([facility] E66.01    Migraine without aura or status migrainosus G43.009       Social History     Socioeconomic History    Marital status: SINGLE   Tobacco Use    Smoking status: Every Day    Smokeless tobacco: Never   Substance and Sexual Activity    Alcohol use: Not Currently        Review of Systems   Review of Systems   Constitutional:  Negative for chills and fever. Cardiovascular:  Negative for chest pain and palpitations. Gastrointestinal:  Negative for heartburn and nausea. Neurological:  Negative for dizziness and headaches. Objective:   Visit Vitals  BP (!) 140/98 (BP 1 Location: Right lower arm, BP Patient Position: Sitting, BP Cuff Size: Small adult)   Pulse (!) 104   Temp 97.8 °F (36.6 °C) (Oral)   Resp 18   Ht 5' 4\" (1.626 m)   Wt 242 lb 9.6 oz (110 kg)   SpO2 97%   BMI 41.64 kg/m²        Physical Exam  Vitals and nursing note reviewed. Constitutional:       Appearance: Normal appearance. HENT:      Head: Normocephalic and atraumatic. Cardiovascular:      Rate and Rhythm: Normal rate and regular rhythm. Pulses: Normal pulses. Heart sounds: Normal heart sounds. Pulmonary:      Effort: Pulmonary effort is normal.      Breath sounds: Normal breath sounds. Abdominal:      General: Abdomen is flat. Bowel sounds are normal.      Palpations: Abdomen is soft. Musculoskeletal:      Cervical back: Normal range of motion and neck supple. Skin:     General: Skin is warm and dry. Neurological:      Mental Status: She is alert. Pertinent Labs/Studies:      Assessment and orders:       ICD-10-CM ICD-9-CM    1. Severe obesity (HCC)  E66.01 278.01 CBC W/O DIFF      METABOLIC PANEL, COMPREHENSIVE      2. Family history of diabetes mellitus  Z83.3 V18.0 HEMOGLOBIN A1C WITH EAG      3. Migraine without aura and without status migrainosus, not intractable  G43.009 346.10       4.  Lipid screening  Z13.220 V77.91 LIPID PANEL      5. Anxiety and depression  F41.9 300.00 buPROPion (WELLBUTRIN) 100 mg tablet    F32. A 311       6. Migraine without status migrainosus, not intractable, unspecified migraine type  G43.909 346.90 topiramate (TOPAMAX) 50 mg tablet      7. Seborrheic dermatitis of scalp  L21.9 690.18 betamethasone valerate (LUXIQ) 0.12 % topical foam      8. Fatigue, unspecified type  R53.83 780.79 TSH 3RD GENERATION      VITAMIN D, 25 HYDROXY      VITAMIN B12 & FOLATE        Diagnoses and all orders for this visit:    1. Severe obesity ([facility]): Will record   -     CBC W/O DIFF; Future  -     METABOLIC PANEL, COMPREHENSIVE; Future    2. Family history of diabetes mellitus:   -     HEMOGLOBIN A1C WITH EAG; Future    3. Migraine without aura and without status migrainosus, not intractable: Overall controlled    4. Lipid screening  -     LIPID PANEL; Future    5. Anxiety and depression  -     buPROPion (WELLBUTRIN) 100 mg tablet; Take 1 Tablet by mouth two (2) times a day. 6. Migraine without status migrainosus, not intractable, unspecified migraine type  -     topiramate (TOPAMAX) 50 mg tablet; Take 1 Tablet by mouth two (2) times a day. 7. Seborrheic dermatitis of scalp  -     betamethasone valerate (LUXIQ) 0.12 % topical foam; Apply  to affected area two (2) times a day. 8. Fatigue, unspecified type  -     TSH 3RD GENERATION; Future  -     VITAMIN D, 25 HYDROXY; Future  -     VITAMIN B12 & FOLATE; Future    Follow-up and Dispositions    Return in about 2 weeks (around 10/21/2022). I have discussed the diagnosis with the patient and the intended plan as seen in the above orders. Social history, medical history, and labs were reviewed. The patient has received an after-visit summary and questions were answered concerning future plans. I have discussed medication side effects and warnings with the patient as well.     Everlyn Burkitt, MD [facility]  10/07/22

## 2022-10-07 NOTE — PROGRESS NOTES
1. Have you been to the ER, urgent care clinic since your last visit? Hospitalized since your last visit? No    2. Have you seen or consulted any other health care providers outside of the [address] [place] [address] since your last visit? Including any pap smears or colon screening.  No      Health Maintenance Due   Topic Date Due    Hepatitis C Screening  Never done    COVID-19 Vaccine (1) Never done    Pneumococcal 0-64 years (1 - PCV) Never done    DTaP/Tdap/Td series (1 - Tdap) Never done    Cervical cancer screen  Never done    Flu Vaccine (1) 08/01/2022    Depression Monitoring  08/19/2022

## 2022-10-08 LAB
25(OH)D3 SERPL-MCNC: 10.8 NG/ML (ref 30–100)
ALBUMIN SERPL-MCNC: 4 G/DL (ref 3.5–5)
ALBUMIN/GLOB SERPL: 1.1 {RATIO} (ref 1.1–2.2)
ALP SERPL-CCNC: 93 U/L (ref 45–117)
ALT SERPL-CCNC: 39 U/L (ref 12–78)
ANION GAP SERPL CALC-SCNC: 4 MMOL/L (ref 5–15)
AST SERPL-CCNC: 18 U/L (ref 15–37)
BILIRUB SERPL-MCNC: 0.5 MG/DL (ref 0.2–1)
BUN SERPL-MCNC: 15 MG/DL (ref 6–20)
BUN/CREAT SERPL: 18 (ref 12–20)
CALCIUM SERPL-MCNC: 9.2 MG/DL (ref 8.5–10.1)
CHLORIDE SERPL-SCNC: 109 MMOL/L (ref 97–108)
CHOLEST SERPL-MCNC: 190 MG/DL
CO2 SERPL-SCNC: 25 MMOL/L (ref 21–32)
CREAT SERPL-MCNC: 0.84 MG/DL (ref 0.55–1.02)
ERYTHROCYTE [DISTWIDTH] IN BLOOD BY AUTOMATED COUNT: 12.7 % (ref 11.5–14.5)
EST. AVERAGE GLUCOSE BLD GHB EST-MCNC: 103 MG/DL
FOLATE SERPL-MCNC: 13.4 NG/ML (ref 5–21)
GLOBULIN SER CALC-MCNC: 3.6 G/DL (ref 2–4)
GLUCOSE SERPL-MCNC: 99 MG/DL (ref 65–100)
HBA1C MFR BLD: 5.2 % (ref 4–5.6)
HCT VFR BLD AUTO: 44.8 % (ref 35–47)
HDLC SERPL-MCNC: 60 MG/DL
HDLC SERPL: 3.2 {RATIO} (ref 0–5)
HGB BLD-MCNC: 14.5 G/DL (ref 11.5–16)
LDLC SERPL CALC-MCNC: 108.2 MG/DL (ref 0–100)
MCH RBC QN AUTO: 32.1 PG (ref 26–34)
MCHC RBC AUTO-ENTMCNC: 32.4 G/DL (ref 30–36.5)
MCV RBC AUTO: 99.1 FL (ref 80–99)
NRBC # BLD: 0 K/UL (ref 0–0.01)
NRBC BLD-RTO: 0 PER 100 WBC
PLATELET # BLD AUTO: 452 K/UL (ref 150–400)
PMV BLD AUTO: 9.6 FL (ref 8.9–12.9)
POTASSIUM SERPL-SCNC: 4.9 MMOL/L (ref 3.5–5.1)
PROT SERPL-MCNC: 7.6 G/DL (ref 6.4–8.2)
RBC # BLD AUTO: 4.52 M/UL (ref 3.8–5.2)
SODIUM SERPL-SCNC: 138 MMOL/L (ref 136–145)
TRIGL SERPL-MCNC: 109 MG/DL (ref ?–150)
TSH SERPL DL<=0.05 MIU/L-ACNC: 1.02 UIU/ML (ref 0.36–3.74)
VIT B12 SERPL-MCNC: 332 PG/ML (ref 193–986)
VLDLC SERPL CALC-MCNC: 21.8 MG/DL
WBC # BLD AUTO: 10.7 K/UL (ref 3.6–11)

## 2022-10-24 ENCOUNTER — OFFICE VISIT (OUTPATIENT)
Dept: FAMILY MEDICINE CLINIC | Age: 32
End: 2022-10-24
Payer: COMMERCIAL

## 2022-10-24 VITALS
OXYGEN SATURATION: 97 % | HEIGHT: 64 IN | SYSTOLIC BLOOD PRESSURE: 109 MMHG | BODY MASS INDEX: 41.32 KG/M2 | DIASTOLIC BLOOD PRESSURE: 71 MMHG | HEART RATE: 83 BPM | TEMPERATURE: 98.3 F | WEIGHT: 242 LBS | RESPIRATION RATE: 18 BRPM

## 2022-10-24 DIAGNOSIS — R53.83 FATIGUE, UNSPECIFIED TYPE: ICD-10-CM

## 2022-10-24 DIAGNOSIS — E66.01 SEVERE OBESITY (HCC): Primary | ICD-10-CM

## 2022-10-24 DIAGNOSIS — Z23 ENCOUNTER FOR IMMUNIZATION: ICD-10-CM

## 2022-10-24 PROCEDURE — 99214 OFFICE O/P EST MOD 30 MIN: CPT | Performed by: FAMILY MEDICINE

## 2022-10-24 PROCEDURE — 90686 IIV4 VACC NO PRSV 0.5 ML IM: CPT | Performed by: FAMILY MEDICINE

## 2022-10-24 RX ORDER — PHENTERMINE HYDROCHLORIDE 15 MG/1
15 CAPSULE ORAL
Qty: 30 CAPSULE | Refills: 1 | Status: SHIPPED | OUTPATIENT
Start: 2022-10-24

## 2022-10-24 NOTE — PROGRESS NOTES
Chief Complaint   Patient presents with    Weight Management     Has food diary, discuss options. 1. \"Have you been to the ER, urgent care clinic since your last visit? Hospitalized since your last visit? \" Yes [address] ER, umbilical bleed. 2. \"Have you seen or consulted any other health care providers outside of the [address] [place] [address] since your last visit? \" No     3. For patients aged 39-70: Has the patient had a colonoscopy / FIT/ Cologuard? NA - based on age      If the patient is female:    4. For patients aged 41-77: Has the patient had a mammogram within the past 2 years? NA - based on age or sex      11. For patients aged 21-65: Has the patient had a pap smear?  No    Health Maintenance Due   Topic Date Due    Hepatitis C Screening  Never done    COVID-19 Vaccine (1) Never done    Pneumococcal 0-64 years (1 - PCV) Never done    DTaP/Tdap/Td series (1 - Tdap) Never done    Cervical cancer screen  Never done    Flu Vaccine (1) 08/01/2022

## 2022-10-24 NOTE — PROGRESS NOTES
[facility] Weight Loss Clinic  Initial Visit      Date: 10/24/22      CC: Pt is a 32 y.o. female who presents for weight loss counseling, initial visit. BMI today: Body mass index is 41.54 kg/m². Weight today: Wt Readings from Last 1 Encounters:   10/24/22 242 lb (109.8 kg)     Comorbid conditions: None  Goals for weight loss: 170-180 lbs  Highest 242 lbs    Diet history:     Breakfast: Skips usually and drinks coffee    Lunch: None    Dinner: Mixed, but mostly fas food or a pasta dish    Snacks: None    Drinks: Sweet Tea, Diet coke, and White Claws    Cravings: None    Dietary preferences/cultural restrictions: None    Have you ever tried any diets/structured weight loss programs?: None    Has patient ever been on medications for weight loss? If so, which ones, how long, and how effective were they?: None    Has patient ever had surgery for weight loss?: No    Health Maintenance Due   Topic Date Due    Hepatitis C Screening  Never done    COVID-19 Vaccine (1) Never done    Pneumococcal 0-64 years (1 - PCV) Never done    DTaP/Tdap/Td series (1 - Tdap) Never done    Cervical cancer screen  Never done       Current Outpatient Medications on File Prior to Visit   Medication Sig Dispense Refill    buPROPion (WELLBUTRIN) 100 mg tablet Take 1 Tablet by mouth two (2) times a day. 180 Tablet 0    topiramate (TOPAMAX) 50 mg tablet Take 1 Tablet by mouth two (2) times a day. 180 Tablet 1    betamethasone valerate (LUXIQ) 0.12 % topical foam Apply  to affected area two (2) times a day. 50 g 2    ketoconazole (NIZORAL) 2 % shampoo APPLY TO SCALP TWICE WEEKLY FOR 8 WEEKS 120 mL 0     No current facility-administered medications on file prior to visit.         PE:  Visit Vitals  /71 (BP 1 Location: Right lower arm, BP Patient Position: Sitting, BP Cuff Size: Small adult)   Pulse 83   Temp 98.3 °F (36.8 °C) (Oral)   Resp 18   Ht 5' 4\" (1.626 m)   Wt 242 lb (109.8 kg)   LMP 10/03/2022   SpO2 97%   BMI 41.54 kg/m²     Gen: Pt sitting in chair, in NAD  Head: Normocephalic, atraumatic  Eyes: Sclera anicteric, EOM grossly intact, PERRL  Throat: MMM, normal lips, tongue, teeth and gums  Neck: Supple, no LAD  CVS: Normal S1, S2, no m/r/g  Resp: CTAB, no wheezes or rales  Abd: Soft, non-tender, non-distended, +BS  Extrem: Atraumatic, no cyanosis or edema  Pulses: 2+  Skin: Warm, dry  Neuro: Alert, oriented, appropriate    Pertinent labs:  Lab Results   Component Value Date/Time    Hemoglobin A1c 5.2 10/07/2022 04:15 PM     Lab Results   Component Value Date/Time    Sodium 138 10/07/2022 04:15 PM    Potassium 4.9 10/07/2022 04:15 PM    Chloride 109 (H) 10/07/2022 04:15 PM    CO2 25 10/07/2022 04:15 PM    Anion gap 4 (L) 10/07/2022 04:15 PM    Glucose 99 10/07/2022 04:15 PM    BUN 15 10/07/2022 04:15 PM    Creatinine 0.84 10/07/2022 04:15 PM    BUN/Creatinine ratio 18 10/07/2022 04:15 PM    GFR est AA >60 04/28/2011 05:35 AM    GFR est non-AA >60 04/28/2011 05:35 AM    Calcium 9.2 10/07/2022 04:15 PM    Bilirubin, total 0.5 10/07/2022 04:15 PM    Alk. phosphatase 93 10/07/2022 04:15 PM    Protein, total 7.6 10/07/2022 04:15 PM    Albumin 4.0 10/07/2022 04:15 PM    Globulin 3.6 10/07/2022 04:15 PM    A-G Ratio 1.1 10/07/2022 04:15 PM    ALT (SGPT) 39 10/07/2022 04:15 PM     Lab Results   Component Value Date/Time    Cholesterol, total 190 10/07/2022 04:15 PM    HDL Cholesterol 60 10/07/2022 04:15 PM    LDL, calculated 108.2 (H) 10/07/2022 04:15 PM    VLDL, calculated 21.8 10/07/2022 04:15 PM    Triglyceride 109 10/07/2022 04:15 PM    CHOL/HDL Ratio 3.2 10/07/2022 04:15 PM     Lab Results   Component Value Date/Time    TSH 1.02 10/07/2022 04:15 PM       A/P: Pt is a 32 y.o. female who present for weight loss counseling, initial visit.  - Diet history taken today and recommendations made as noted in Patient Instructions  - Medications discussed today and decision made to Start Phentermine  - Weight loss surgery discussed today and decision made to Not consider  - RTC in 1 month for obesity follow-up    I have reviewed/discussed the above normal BMI with the patient. I have recommended the following interventions: dietary management education, guidance, and counseling, monitor weight and prescribed dietary intake.     Saleem Berumen MD  Resident [address] [place] [address] Medicine  10/24/22

## 2022-10-24 NOTE — PATIENT INSTRUCTIONS
- Cut back the Sweet Tea (Half and half then unsweet)  - Can drink Diet  - Avoid the carbs where     Weight Loss Diet Guidelines      Eat ONLY when you are hungry, and stop just before you are full. If you are eating enough fat in your meals, you will feel full for 5-6 hours after, and you can avoid snacks. This is your goal.     Eat More of these Foods:    Meat: Beef, lamb, pork, chicken and others  Fish: Any kind of fish  Eggs: As many as you want, with the yolk  Vegetables: ANY vegetables but limit starchy vegetables like potatoes, corn, carrots or peas  Nuts and Seeds: No more than one handful a day  High-fat Dairy: Cheese, butter, heavy cream      Eat Less of this, but OK Rarely:    Fruits: Berries and Melon are best. May have one handful per day. Limit other fruits, particularly bananas, grapes, mangos, and pineapple  DO NOT DRINK ANY JUICE, EVER. Whole grains: Oatmeal, quinoa, brown rice  Legumes: Beans, lentils      Do Not Eat these Foods:    Drinks with calories: Sodas, fruit juices, sweet tea, sports drinks (gatorade, etc)  Sugar: Honey, agave, candy, cake, cookies, ice cream  Grains: Bread, pasta, crackers, cereal, chips  Yogurt: Most yogurt is as bad as candy. Eat only high-fat, plain yogurt, like Fage 2% plain. Trans Fats: \"Hydrogenated\" or \"partially hydrogenated\" oils, margarine  \"Diet\" and \"Low fat\" Products  Highly processed foods. If it looks like it was made in a factory, don't eat it. If it has more than 5 ingredients, it is processed. What Should Meals Look Like? Breakfast: Jorge Pick, eggs, sausage or plain yogurt with berries  Lunch: Big salad with meat, cheese, avocado, homemade dressing or olive oil and vinegar. Or meat, chicken, fish and vegetables. Dinner: Meat, chicken or fish and vegetables, or salad, like above  Snack: Berries, cheese, nuts  Drinks:Plenty of water.  May also have coffee, tea, or artificially sweetened beverages (but not too many)

## 2022-12-28 ENCOUNTER — OFFICE VISIT (OUTPATIENT)
Dept: FAMILY MEDICINE CLINIC | Age: 32
End: 2022-12-28
Payer: COMMERCIAL

## 2022-12-28 VITALS
TEMPERATURE: 98.1 F | WEIGHT: 227.6 LBS | RESPIRATION RATE: 18 BRPM | HEIGHT: 64 IN | BODY MASS INDEX: 38.86 KG/M2 | HEART RATE: 85 BPM | SYSTOLIC BLOOD PRESSURE: 125 MMHG | DIASTOLIC BLOOD PRESSURE: 86 MMHG | OXYGEN SATURATION: 96 %

## 2022-12-28 DIAGNOSIS — G43.009 MIGRAINE WITHOUT AURA AND WITHOUT STATUS MIGRAINOSUS, NOT INTRACTABLE: Chronic | ICD-10-CM

## 2022-12-28 DIAGNOSIS — E66.01 SEVERE OBESITY (HCC): Primary | ICD-10-CM

## 2022-12-28 PROCEDURE — 99214 OFFICE O/P EST MOD 30 MIN: CPT | Performed by: FAMILY MEDICINE

## 2022-12-28 RX ORDER — PHENTERMINE HYDROCHLORIDE 30 MG/1
30 CAPSULE ORAL
Qty: 30 CAPSULE | Refills: 1 | Status: SHIPPED | OUTPATIENT
Start: 2022-12-28

## 2022-12-28 NOTE — PROGRESS NOTES
1. Have you been to the ER, urgent care clinic since your last visit? Hospitalized since your last visit? No    2. Have you seen or consulted any other health care providers outside of the [address] [place] [address] since your last visit? Include any pap smears or colon screening. No    3. For patients aged 39-70: Has the patient had a colonoscopy / FIT/ Cologuard? NA - based on age    If the patient is female:    4. For patients aged 41-77: Has the patient had a mammogram within the past 2 years? NA - based on age or sex      11. For patients aged 21-65: Has the patient had a pap smear? Few years per pt     Reviewed record in preparation for visit and have necessary documentation  Pt did not bring medication to office visit for review  Patient is accompanied by self I have received verbal consent from Jeana Trujillo to discuss any/all medical information while they are present in the room.     Goals that were addressed and/or need to be completed during or after this appointment include     Health Maintenance Due   Topic Date Due    Hepatitis C Screening  Never done    Pneumococcal 0-64 years (1 - PCV) Never done    Cervical cancer screen  Never done    COVID-19 Vaccine (2 - Booster for Yudi series) 08/10/2021 Cartilage Graft Text: The defect edges were debeveled with a #15 scalpel blade.  Given the location of the defect, shape of the defect, the fact the defect involved a full thickness cartilage defect a cartilage graft was deemed most appropriate.  An appropriate donor site was identified, cleansed, and anesthetized. The cartilage graft was then harvested and transferred to the recipient site, oriented appropriately and then sutured into place.  The secondary defect was then repaired using a primary closure.

## 2022-12-28 NOTE — PROGRESS NOTES
[facility]  Follow-up Visit      Date: 12/28/22      CC: Pt is a 28 y.o. female who presents for weight loss counseling, follow-up visit. BMI today: Body mass index is 39.07 kg/m². BMI Readings from Last 3 Encounters:   12/28/22 39.07 kg/m²   10/24/22 41.54 kg/m²   10/07/22 41.64 kg/m²       Weight today: Wt Readings from Last 1 Encounters:   12/28/22 227 lb 9.6 oz (103.2 kg)     Recent Weights: Wt Readings from Last 3 Encounters:   12/28/22 227 lb 9.6 oz (103.2 kg)   10/24/22 242 lb (109.8 kg)   10/07/22 242 lb 9.6 oz (110 kg)     Weight change from last visit: Lost 15-20 lbs  Comorbid conditions: None  Goals for weight loss: 170-180 lbs  Progression towards goals: Doing well    HPI:     Current diet: Improved, Multiple small meals    Breakfast: Mixed    Lunch: [place]    Dinner: Mixed,     Snacks: None    Drinks: Unsweet teas, some white claws, Diet Coke, Water    Compliance with medications: Great    Exercise: None    Health Maintenance Due   Topic Date Due    Hepatitis C Screening  Never done    Pneumococcal 0-64 years (1 - PCV) Never done    Cervical cancer screen  Never done    COVID-19 Vaccine (2 - Booster for Yudi series) 08/10/2021       Current Outpatient Medications on File Prior to Visit   Medication Sig Dispense Refill    buPROPion (WELLBUTRIN) 100 mg tablet Take 1 Tablet by mouth two (2) times a day. 180 Tablet 0    topiramate (TOPAMAX) 50 mg tablet Take 1 Tablet by mouth two (2) times a day. 180 Tablet 1    [DISCONTINUED] phentermine (ADIPEX_P) 15 mg capsule Take 1 Capsule by mouth every morning. Max Daily Amount: 15 mg. 30 Capsule 1    betamethasone valerate (LUXIQ) 0.12 % topical foam Apply  to affected area two (2) times a day.  (Patient not taking: Reported on 12/28/2022) 50 g 2    ketoconazole (NIZORAL) 2 % shampoo APPLY TO SCALP TWICE WEEKLY FOR 8 WEEKS (Patient not taking: Reported on 12/28/2022) 120 mL 0     No current facility-administered medications on file prior to visit. PE:  Visit Vitals  /86 (BP 1 Location: Right lower arm, BP Patient Position: Sitting, BP Cuff Size: Adult)   Pulse 85   Temp 98.1 °F (36.7 °C) (Oral)   Resp 18   Ht 5' 4\" (1.626 m)   Wt 227 lb 9.6 oz (103.2 kg)   LMP 12/05/2022 (Within Days)   SpO2 96%   BMI 39.07 kg/m²     Gen: Pt sitting in chair, in NAD  Head: Normocephalic, atraumatic  Eyes: Sclera anicteric, EOM grossly intact, PERRL  Throat: MMM  Neck: Supple  CVS: Normal S1, S2, no m/r/g  Resp: CTAB, no wheezes or rales  Extrem: Atraumatic, no cyanosis or edema  Pulses: 2+  Skin: Warm, dry  Neuro: Alert, oriented, appropriate    A/P: Pt is a 28 y.o. female who presents for weight loss follow-up. Progressing Well  - RTC in 1 month for weight loss follow-up      I have reviewed/discussed the above normal BMI with the patient. I have recommended the following interventions: dietary management education, guidance, and counseling, monitor weight and prescribed dietary intake .         MD PORTILLO Hardy & LEIGH LAMAS [facility]  12/28/22

## 2023-01-26 ENCOUNTER — PATIENT MESSAGE (OUTPATIENT)
Dept: FAMILY MEDICINE CLINIC | Age: 33
End: 2023-01-26

## 2023-01-27 DIAGNOSIS — E66.01 SEVERE OBESITY (HCC): ICD-10-CM

## 2023-01-27 RX ORDER — PHENTERMINE HYDROCHLORIDE 30 MG/1
CAPSULE ORAL
Qty: 30 CAPSULE | Refills: 0 | Status: SHIPPED | OUTPATIENT
Start: 2023-01-27

## 2023-02-22 ENCOUNTER — ANESTHESIA EVENT (OUTPATIENT)
Dept: SURGERY | Age: 33
End: 2023-02-22
Payer: COMMERCIAL

## 2023-02-23 ENCOUNTER — ANESTHESIA (OUTPATIENT)
Dept: SURGERY | Age: 33
End: 2023-02-23
Payer: COMMERCIAL

## 2023-02-23 ENCOUNTER — APPOINTMENT (OUTPATIENT)
Dept: GENERAL RADIOLOGY | Age: 33
End: 2023-02-23
Attending: ORTHOPAEDIC SURGERY
Payer: COMMERCIAL

## 2023-02-23 ENCOUNTER — HOSPITAL ENCOUNTER (OUTPATIENT)
Age: 33
Setting detail: OUTPATIENT SURGERY
Discharge: HOME | End: 2023-02-23
Attending: ORTHOPAEDIC SURGERY | Admitting: ORTHOPAEDIC SURGERY
Payer: COMMERCIAL

## 2023-02-23 VITALS
HEIGHT: 65 IN | BODY MASS INDEX: 36.22 KG/M2 | OXYGEN SATURATION: 99 % | DIASTOLIC BLOOD PRESSURE: 83 MMHG | SYSTOLIC BLOOD PRESSURE: 103 MMHG | HEART RATE: 91 BPM | WEIGHT: 217.37 LBS | RESPIRATION RATE: 14 BRPM | TEMPERATURE: 98 F

## 2023-02-23 LAB — HCG UR QL: NEGATIVE

## 2023-02-23 PROCEDURE — 74011250637 HC RX REV CODE- 250/637: Performed by: ORTHOPAEDIC SURGERY

## 2023-02-23 PROCEDURE — 76010000138 HC OR TIME 0.5 TO 1 HR: Performed by: ORTHOPAEDIC SURGERY

## 2023-02-23 PROCEDURE — 81025 URINE PREGNANCY TEST: CPT

## 2023-02-23 PROCEDURE — 76210000020 HC REC RM PH II FIRST 0.5 HR: Performed by: ORTHOPAEDIC SURGERY

## 2023-02-23 PROCEDURE — 77030002933 HC SUT MCRYL J&J -A: Performed by: ORTHOPAEDIC SURGERY

## 2023-02-23 PROCEDURE — 77030040922 HC BLNKT HYPOTHRM STRY -A

## 2023-02-23 PROCEDURE — 77030002916 HC SUT ETHLN J&J -A: Performed by: ORTHOPAEDIC SURGERY

## 2023-02-23 PROCEDURE — 77030040361 HC SLV COMPR DVT MDII -B

## 2023-02-23 PROCEDURE — 74011250636 HC RX REV CODE- 250/636: Performed by: ANESTHESIOLOGY

## 2023-02-23 PROCEDURE — 74011000250 HC RX REV CODE- 250: Performed by: ORTHOPAEDIC SURGERY

## 2023-02-23 PROCEDURE — 76210000063 HC OR PH I REC FIRST 0.5 HR: Performed by: ORTHOPAEDIC SURGERY

## 2023-02-23 PROCEDURE — 76060000032 HC ANESTHESIA 0.5 TO 1 HR: Performed by: ORTHOPAEDIC SURGERY

## 2023-02-23 PROCEDURE — 2709999900 HC NON-CHARGEABLE SUPPLY: Performed by: ORTHOPAEDIC SURGERY

## 2023-02-23 PROCEDURE — 74011250636 HC RX REV CODE- 250/636: Performed by: NURSE ANESTHETIST, CERTIFIED REGISTERED

## 2023-02-23 RX ORDER — MIDAZOLAM HYDROCHLORIDE 1 MG/ML
INJECTION, SOLUTION INTRAMUSCULAR; INTRAVENOUS AS NEEDED
Status: DISCONTINUED | OUTPATIENT
Start: 2023-02-23 | End: 2023-02-23 | Stop reason: HOSPADM

## 2023-02-23 RX ORDER — PROPOFOL 10 MG/ML
INJECTION, EMULSION INTRAVENOUS
Status: DISCONTINUED | OUTPATIENT
Start: 2023-02-23 | End: 2023-02-23 | Stop reason: HOSPADM

## 2023-02-23 RX ORDER — SODIUM CHLORIDE, SODIUM LACTATE, POTASSIUM CHLORIDE, CALCIUM CHLORIDE 600; 310; 30; 20 MG/100ML; MG/100ML; MG/100ML; MG/100ML
INJECTION, SOLUTION INTRAVENOUS
Status: DISCONTINUED | OUTPATIENT
Start: 2023-02-23 | End: 2023-02-23 | Stop reason: HOSPADM

## 2023-02-23 RX ORDER — SODIUM CHLORIDE, SODIUM LACTATE, POTASSIUM CHLORIDE, CALCIUM CHLORIDE 600; 310; 30; 20 MG/100ML; MG/100ML; MG/100ML; MG/100ML
75 INJECTION, SOLUTION INTRAVENOUS CONTINUOUS
Status: DISCONTINUED | OUTPATIENT
Start: 2023-02-23 | End: 2023-02-23 | Stop reason: HOSPADM

## 2023-02-23 RX ORDER — HYDROMORPHONE HYDROCHLORIDE 1 MG/ML
.25-1 INJECTION, SOLUTION INTRAMUSCULAR; INTRAVENOUS; SUBCUTANEOUS
Status: DISCONTINUED | OUTPATIENT
Start: 2023-02-23 | End: 2023-02-23 | Stop reason: HOSPADM

## 2023-02-23 RX ORDER — ACETAMINOPHEN 325 MG/1
650 TABLET ORAL
Status: COMPLETED | OUTPATIENT
Start: 2023-02-23 | End: 2023-02-23

## 2023-02-23 RX ORDER — SODIUM CHLORIDE, SODIUM LACTATE, POTASSIUM CHLORIDE, CALCIUM CHLORIDE 600; 310; 30; 20 MG/100ML; MG/100ML; MG/100ML; MG/100ML
125 INJECTION, SOLUTION INTRAVENOUS CONTINUOUS
Status: DISCONTINUED | OUTPATIENT
Start: 2023-02-23 | End: 2023-02-23 | Stop reason: HOSPADM

## 2023-02-23 RX ORDER — DIPHENHYDRAMINE HYDROCHLORIDE 50 MG/ML
12.5 INJECTION, SOLUTION INTRAMUSCULAR; INTRAVENOUS AS NEEDED
Status: DISCONTINUED | OUTPATIENT
Start: 2023-02-23 | End: 2023-02-23 | Stop reason: HOSPADM

## 2023-02-23 RX ORDER — FENTANYL CITRATE 50 UG/ML
INJECTION, SOLUTION INTRAMUSCULAR; INTRAVENOUS AS NEEDED
Status: DISCONTINUED | OUTPATIENT
Start: 2023-02-23 | End: 2023-02-23 | Stop reason: HOSPADM

## 2023-02-23 RX ORDER — GABAPENTIN 300 MG/1
300 CAPSULE ORAL
Status: COMPLETED | OUTPATIENT
Start: 2023-02-23 | End: 2023-02-23

## 2023-02-23 RX ADMIN — GABAPENTIN 300 MG: 300 CAPSULE ORAL at 08:58

## 2023-02-23 RX ADMIN — SODIUM CHLORIDE, POTASSIUM CHLORIDE, SODIUM LACTATE AND CALCIUM CHLORIDE 75 ML/HR: 600; 310; 30; 20 INJECTION, SOLUTION INTRAVENOUS at 08:56

## 2023-02-23 RX ADMIN — MIDAZOLAM HYDROCHLORIDE 2 MG: 1 INJECTION, SOLUTION INTRAMUSCULAR; INTRAVENOUS at 10:21

## 2023-02-23 RX ADMIN — FENTANYL CITRATE 50 MCG: 50 INJECTION, SOLUTION INTRAMUSCULAR; INTRAVENOUS at 10:28

## 2023-02-23 RX ADMIN — HYDROMORPHONE HYDROCHLORIDE 0.5 MG: 1 INJECTION, SOLUTION INTRAMUSCULAR; INTRAVENOUS; SUBCUTANEOUS at 11:07

## 2023-02-23 RX ADMIN — PROPOFOL 200 MCG/KG/MIN: 10 INJECTION, EMULSION INTRAVENOUS at 10:28

## 2023-02-23 RX ADMIN — SODIUM CHLORIDE, POTASSIUM CHLORIDE, SODIUM LACTATE AND CALCIUM CHLORIDE: 600; 310; 30; 20 INJECTION, SOLUTION INTRAVENOUS at 09:44

## 2023-02-23 RX ADMIN — ACETAMINOPHEN 650 MG: 325 TABLET ORAL at 08:58

## 2023-02-23 RX ADMIN — FENTANYL CITRATE 50 MCG: 50 INJECTION, SOLUTION INTRAMUSCULAR; INTRAVENOUS at 10:25

## 2023-02-23 NOTE — BRIEF OP NOTE
Brief Postoperative Note    Patient: Kathryn Roberts  YOB: 1990  MRN: [id number]    Date of Procedure: 2/23/2023     Pre-Op Diagnosis: PAINFUL ORTHOPAEDIC HARDWARE LEFT ANKLE    Post-Op Diagnosis: Same as preoperative diagnosis. Procedure(s):  REMOVAL OF 2 SYNDESMOSIS SCREWS LEFT LATERAL ANKLE    INDEPENDENT INTERPRETATION OF INTRA-OPERATIVE FLUOROSCOPY    Surgeon(s): Mackenzie Blue MD    Surgical Assistant: During the procedure Molly Montero PA-C performed the duties of positioning, retraction, assistance with limb and implant removal as well as closure and dressing placement      Anesthesia: Local with sedation     Estimated Blood Loss (mL): less than 5cc     Complications: None    Specimens: * No specimens in log *     Implants: * No implants in log *    Drains: * No LDAs found *    Findings: healed LEFT ankle fx    TT: hemaclear x 10 min    Electronically Signed by Zack Cheek MD on 2/23/2023 at 10:13 AM

## 2023-02-23 NOTE — H&P
Date of Surgery Update:  Bob Johnson was seen and examined. History and physical has been reviewed. The patient has been examined. There have been no significant clinical changes since the completion of the originally dated History and Physical.  Patient identified by surgeon; surgical site was confirmed by patient and surgeon. Full paper H&P on chart    Signed By: Jimmie Stephen.  MD Ham     February 23, 2023 10:12 AM

## 2023-02-23 NOTE — ANESTHESIA PREPROCEDURE EVALUATION
Relevant Problems   No relevant active problems       Anesthetic History   No history of anesthetic complications            Review of Systems / Medical History  Patient summary reviewed and pertinent labs reviewed    Pulmonary  Within defined limits                 Neuro/Psych         Headaches    Comments: migrain Cardiovascular                  Exercise tolerance: >4 METS     GI/Hepatic/Renal     GERD: well controlled           Endo/Other        Obesity  Pertinent negatives: No morbid obesity   Other Findings            Physical Exam    Airway  Mallampati: I  TM Distance: 4 - 6 cm         Cardiovascular  Regular rate and rhythm,  S1 and S2 normal,  no murmur, click, rub, or gallop             Dental  No notable dental hx       Pulmonary  Breath sounds clear to auscultation               Abdominal         Other Findings            Anesthetic Plan    ASA: 2  Anesthesia type: MAC          Induction: Intravenous  Anesthetic plan and risks discussed with: Patient

## 2023-02-23 NOTE — DISCHARGE INSTRUCTIONS
DISCHARGE SUMMARY from your Nurse    The following personal items collected during your admission are returned to you:   Dental Appliance: Dental Appliances: None  Vision:    Hearing Aid:    Jewelry: Jewelry: None  Clothing: Clothing:  (street clothe to [employer])  Other Valuables: Other Valuables: Cell Phone, Eyeglasses  Valuables sent to safe:      PATIENT INSTRUCTIONS:    After general anesthesia or intravenous sedation, for 24 hours or while taking prescription Narcotics:  Limit your activities  Do not drive and operate hazardous machinery  Do not make important personal or business decisions  Do  not drink alcoholic beverages  If you have not urinated within 8 hours after discharge, please contact your surgeon on call. Report the following to your surgeon:  Excessive pain, swelling, redness or odor of or around the surgical area  Temperature over 100.5  Nausea and vomiting lasting longer than 4 hours or if unable to take medications  Any signs of decreased circulation or nerve impairment to extremity: change in color, persistent  numbness, tingling, coldness or increase pain  Any questions    COUGH AND DEEP BREATHE    Breathing deep and coughing are very important exercises to do after surgery. Deep breathing and coughing open the little air tubes and air sacks in your lungs. You take deep breaths every day. You may not even notice - it is just something you do when you sigh or yawn. It is a natural exercise you do to keep these air passages open. After surgery, take deep breaths and cough, on purpose. Coughing and deep breathing help prevent bronchitis and pneumonia after surgery. If you had chest or belly surgery, use a pillow as a \"hug buddy\" and hold it tightly to your chest or belly when you cough. DIRECTIONS:  Take 10 to 15 slow deep breaths every hour while awake. Breathe in deeply, and hold it for 2 seconds.   Exhale slowly through puckered lips, like blowing up a balloon. After every 4th or 5th deep breath, hug your pillow to your chest or belly and give a hard, deep cough. Yes, it will probably hurt. But doing this exercise is very important part of healing after surgery. Take your pain medicine to help you do this exercise without too much pain. IF YOU HAVE BEEN DIAGNOSED WITH SLEEP APNEA, PLEASE USE YOUR SLEEP APNEA DEVICE OR CPAP MACHINE WHEN YOU INTEND TO NAP AFTER TAKING PAIN MEDICATION. Ankle Pumps    Ankle pumps increase the circulation of oxygenated blood to your lower extremities and decrease your risk for circulation problems such as blood clots. They also stretch the muscles, tendons and ligaments in your foot and ankle, and prevent joint contracture in the ankle and foot, especially after surgeries on the legs. It is important to do ankle pump exercises regularly after surgery because immobility increases your risk for developing a blood clot. Your doctor may also have you take an Aspirin for the next few days as well. If your doctor did not ask you to take an Aspirin, consult with him before starting Aspirin therapy on your own. Slowly point your foot forward, feeling the muscles on the top of your lower leg stretch, and hold this position for 5 seconds. Next, pull your foot back toward you as far as possible, stretching the calf muscles, and hold that position for 5 seconds. Repeat with the other foot. Perform 10 repetitions every hour while awake for both ankles if possible (down and then up with the foot once is one repetition). You should feel gentle stretching of the muscles in your lower leg when doing this exercise. If you feel pain, or your range of motion is limited, don't  Push too hard. Only go the limit your joint and muscles will let you go. If you have increasing pain, progressively worsening leg warmth or swelling, STOP the exercise and call your doctor.      Below is information about the medications your doctor is prescribing after your visit:    Other information in your discharge envelope:  []     PRESCRIPTIONS  []     SCHOOL/WORK NOTE  []     INFECTION PREVENTION  []     [place]  []     REGIONAL NERVE BLOCK ON QUE PAMPHLET   []     EXPAREL  []     HANDICAP APPLICATION         These are general instructions for a healthy lifestyle:    *  Please give a list of your current medications to your Primary Care Provider. *  Please update this list whenever your medications are discontinued, doses are      changed, or new medications (including over-the-counter products) are added. *  Please carry medication information at all times in case of emergency situations. About Smoking  No smoking / No tobacco products / Avoid exposure to second hand smoke    Surgeon General's Warning:  Quitting smoking now greatly reduces serious risk to your health. Obesity, smoking, and sedentary lifestyle greatly increases your risk for illness and disease. A healthy diet, regular physical exercise & weight monitoring are important for maintaining a healthy lifestyle. Congestive Heart Failure  You may be retaining fluid if you have a history of heart failure or if you experience any of the following symptoms:  Weight gain of 3 pounds or more overnight or 5 pounds in a week, increased swelling in our hands or feet or shortness of breath while lying flat in bed. Please call your doctor as soon as you notice any of these symptoms; do not wait until your next office visit. Recognize signs and symptoms of STROKE:  F - face looks uneven  A - arms unable to move or move even  S - speech slurred or non-existent  T - time-call 911 as soon as signs and symptoms begin-DO NOT go         Back to bed or wait to see if you get better-TIME IS BRAIN. Warning signs of HEART ATTACK  Call 911 if you have these symptoms    Chest discomfort.   Most heart attacks involve discomfort in the center of the chest that lasts more than a few minutes, or that goes away and comes back. It can feel like uncomfortable pressure, squeezing, fullness, or pain. Discomfort in other areas of the upper body. Symptoms can include pain or discomfort in one or both        Arms, the back, neck, jaw, or stomach. Shortness of breath with or without chest discomfort. Other signs may include breaking out in a cold sweat, nausea, or lightheadedness    Don't wait more than five minutes to call 911 - MINUTES MATTER! Fast action can save your life. Calling 911 is almost always the fastest way to get lifesaving treatment. Emergency Medical Services staff can begin treatment when they arrive - up to an hour sooner than if someone gets to the hospital by car.

## 2023-02-23 NOTE — ANESTHESIA POSTPROCEDURE EVALUATION
Procedure(s):  REMOVAL OF 2 SYNDESMOSIS SCREWS LEFT LATERAL ANKLE (ANES LOCAL WITH GENERAL VS SEDATION) (EIP 1000). MAC    Anesthesia Post Evaluation        Patient location during evaluation: PACU  Level of consciousness: awake  Pain management: adequate  Airway patency: patent  Anesthetic complications: no  Cardiovascular status: acceptable  Respiratory status: acceptable  Hydration status: acceptable  Post anesthesia nausea and vomiting:  none      INITIAL Post-op Vital signs:   Vitals Value Taken Time   /83 02/23/23 1120   Temp 36.9 °C (98.4 °F) 02/23/23 1100   Pulse 94 02/23/23 1124   Resp 21 02/23/23 1124   SpO2 97 % 02/23/23 1124   Vitals shown include unvalidated device data.

## 2023-02-24 NOTE — OP NOTES
Filemon Espino [facility]  OPERATIVE REPORT    Name:  Ilsa Miller  MR#:  [id number]  :  1990  ACCOUNT #:  [de-identified]  DATE OF SERVICE:  2023    PREOPERATIVE DIAGNOSIS:  Painful orthopedic hardware, left ankle. POSTOPERATIVE DIAGNOSIS:  Painful orthopedic hardware, left ankle. PROCEDURES PERFORMED:  1. Removal of two syndesmotic screws, left lateral ankle. 2.  Independent interpretation of intraoperative fluoroscopy. SURGEON:  Miguel Rowe. Roger Vázquez MD    ASSISTANT:  Ora Valdovinos PA-C , surgical assistant. ANESTHESIA:  Local and sedation. COMPLICATIONS:  None. SPECIMENS REMOVED:  None. IMPLANTS:  None. ESTIMATED BLOOD LOSS:  Less than 5 mL. DRAINS:  None. FINDINGS:  Healed left ankle fracture. TOURNIQUET TIME:  HemaClear tourniquet for 10 minutes. INDICATION  FOR PROCEDURE:  This is a 55-year-old female who suffered a left bimalleolar ankle fracture that was treated surgically. She also was treated with syndesmosis screws. The syndesmosis screws were starting to back out and causing her pain. I offered both conservative and surgical management options to her. I discussed the risks of surgery which include but are not limited to complications of anesthesia including death, pain, bleeding, infection, damage to surrounding structures, wound healing problems, DVT, PE and the need for further surgery. The patient verbalized understanding and elected to proceed with surgical intervention. We discussed removing all of her hardware versus just the syndesmosis screws which were backing out and she elected to have just the syndesmosis screws removed. DESCRIPTION OF PROCEDURE:  The correct patient, extremity and operation were all identified in the preoperative holding area and I marked her left ankle.   She was taken back to the operating room and sedated after she was placed on the operating room table and secured to the table with safety strap and a beanbag. All bony prominences were padded well and the left lower extremity was prepped and draped in usual sterile fashion. A preop time-out was called. Again, the correct patient, extremity and operation were identified, all parties were in agreement, we proceeded with operation. The patient received IV antibiotics within 30 minutes of the incision. The left lower extremity was then exsanguinated with a HemaClear tourniquet and used on the calf. The surgical field was then anesthetized using a mixture of 1% lidocaine without epinephrine and 0.5% Marcaine without epinephrine. It was a 1:1 mixture of 5 mL of  each anesthetic. Her previous incision was utilized at the middle of the incision and approximately 2.5 cm of the incision was utilized. Sharp dissection was carried down to the level of skin and care was taken to protect the superficial peroneal nerve. The two syndesmosis screws were identified and removed with a screwdriver. Fluoroscopic images of the left ankle confirmed removal of the two syndesmosis screws and the fracture was again seen as healed. The wounds were then copiously irrigated with normal saline and closed in layers. During the procedure, Jose Luis Sorensen PA-C performed the duties of positioning, retraction, assistance with limb and implant management as well as closure and dressing application. A sterile dressing was applied and the tourniquet was dropped and soft wrap applied to the ankle. The patient was then awakened from anesthesia and taken to recovery room in hemodynamically stable condition. All lap and sharp counts were correct at the end of case. POST-OP CARE:  The patient will be discharged home once she meets PACU criteria. She will start aspirin 81 mg p.o. b.i.d. for DVT prophylaxis for 2 weeks and she will be weightbearing as tolerated to the left lower extremity in a boot.    She will follow up with Jose Luis Soresnen in the [address] [place] [address] office as scheduled in two weeks, Amilcar Sanches MD      [id number]  D:  02/23/2023 10:48  T:  02/23/2023 19:26  JOB #:  [id number]

## 2023-04-04 RX ORDER — PHENTERMINE HYDROCHLORIDE 30 MG/1
CAPSULE ORAL
Qty: 30 CAPSULE | Refills: 0 | Status: SHIPPED
Start: 2023-04-04

## 2023-04-04 NOTE — TELEPHONE ENCOUNTER
Patient called for follow up appt post surgical and weight mgmt. Refill requested. Appt scheduled for 4/13/23.

## 2023-04-26 ENCOUNTER — OFFICE VISIT (OUTPATIENT)
Dept: FAMILY MEDICINE CLINIC | Age: 33
End: 2023-04-26
Payer: COMMERCIAL

## 2023-04-26 VITALS
BODY MASS INDEX: 34.32 KG/M2 | WEIGHT: 206 LBS | HEIGHT: 65 IN | OXYGEN SATURATION: 96 % | RESPIRATION RATE: 16 BRPM | TEMPERATURE: 97.6 F | DIASTOLIC BLOOD PRESSURE: 89 MMHG | HEART RATE: 105 BPM | SYSTOLIC BLOOD PRESSURE: 124 MMHG

## 2023-04-26 DIAGNOSIS — E66.01 SEVERE OBESITY (HCC): Primary | ICD-10-CM

## 2023-04-26 DIAGNOSIS — G43.909 MIGRAINE WITHOUT STATUS MIGRAINOSUS, NOT INTRACTABLE, UNSPECIFIED MIGRAINE TYPE: ICD-10-CM

## 2023-04-26 DIAGNOSIS — F41.9 ANXIETY AND DEPRESSION: ICD-10-CM

## 2023-04-26 DIAGNOSIS — R00.0 TACHYCARDIA: ICD-10-CM

## 2023-04-26 DIAGNOSIS — F32.A ANXIETY AND DEPRESSION: ICD-10-CM

## 2023-04-26 PROCEDURE — 99214 OFFICE O/P EST MOD 30 MIN: CPT | Performed by: FAMILY MEDICINE

## 2023-04-26 RX ORDER — BUPROPION HYDROCHLORIDE 100 MG/1
100 TABLET ORAL 2 TIMES DAILY
Qty: 180 TABLET | Refills: 1 | Status: SHIPPED | OUTPATIENT
Start: 2023-04-26

## 2023-04-26 RX ORDER — TOPIRAMATE 50 MG/1
50 TABLET, FILM COATED ORAL 2 TIMES DAILY
Qty: 180 TABLET | Refills: 1 | Status: SHIPPED | OUTPATIENT
Start: 2023-04-26

## 2023-04-26 NOTE — PROGRESS NOTES
[facility]  Follow-up Visit      Date: 04/26/23      CC: Pt is a 28 y.o. female who presents for weight loss counseling, follow-up visit. BMI today: Body mass index is 34.28 kg/m². BMI Readings from Last 3 Encounters:   04/26/23 34.28 kg/m²   02/23/23 36.17 kg/m²   12/28/22 39.07 kg/m²       Weight today: Wt Readings from Last 1 Encounters:   04/26/23 206 lb (93.4 kg)     Recent Weights: Wt Readings from Last 3 Encounters:   04/26/23 206 lb (93.4 kg)   02/23/23 217 lb 6 oz (98.6 kg)   12/28/22 227 lb 9.6 oz (103.2 kg)     Weight change from last visit: 20 lbs weight loss  Comorbid conditions: None  Goals for weight loss: 170-180s  Progression towards goals: Yes    HPI:     Current diet: Improved, Multiple small meals     Breakfast: Mixed     Lunch: [place]     Dinner: Mixed,      Snacks: None     Drinks: Unsweet teas, Diet Coke, Water     Compliance with medications: Great     Exercise: None    Health Maintenance Due   Topic Date Due    Hepatitis C Screening  Never done    Varicella Vaccine (1 of 2 - 2-dose childhood series) Never done    Pneumococcal 0-64 years (1 - PCV) Never done    Cervical cancer screen  Never done    COVID-19 Vaccine (2 - Booster for Yudi series) 08/10/2021       Current Outpatient Medications on File Prior to Visit   Medication Sig Dispense Refill    phentermine 30 mg capsule TAKE ONE CAPSULE BY MOUTH EVERY MORNING *MAX DAILY AMOUNT 30 MG* 30 Capsule 0    betamethasone valerate (LUXIQ) 0.12 % topical foam Apply  to affected area daily as needed for Skin Irritation. (Patient not taking: Reported on 4/26/2023)      ketoconazole (NIZORAL) 2 % shampoo Apply  to affected area as needed for Itching. (Patient not taking: Reported on 4/26/2023)      [DISCONTINUED] buPROPion (WELLBUTRIN) 100 mg tablet Take 1 Tablet by mouth two (2) times a day. 180 Tablet 0    [DISCONTINUED] topiramate (TOPAMAX) 50 mg tablet Take 1 Tablet by mouth two (2) times a day. 180 Tablet 1     No current facility-administered medications on file prior to visit. PE:  Visit Vitals  /89   Pulse (!) 105   Temp 97.6 °F (36.4 °C)   Resp 16   Ht 5' 5\" (1.651 m)   Wt 206 lb (93.4 kg)   SpO2 96%   BMI 34.28 kg/m²     Gen: Pt sitting in chair, in NAD  Head: Normocephalic, atraumatic  Eyes: Sclera anicteric, EOM grossly intact, PERRL  Throat: MMM  Neck: Supple  CVS: Normal S1, S2, no m/r/g, Tachycardia  Resp: CTAB, no wheezes or rales  Extrem: Atraumatic, no cyanosis or edema  Pulses: 2+  Skin: Warm, dry  Neuro: Alert, oriented, appropriate    A/P: Pt is a 28 y.o. female who presents for weight loss follow-up. Progressing well, noting tachycardia,   - RTC in 1 month for weight loss follow-up      I have reviewed/discussed the above normal BMI with the patient. I have recommended the following interventions: dietary management education, guidance, and counseling, monitor weight and prescribed dietary intake .         MD PORTILLO Singh & LEIGH LAMAS [facility]  04/26/23

## 2023-04-26 NOTE — PROGRESS NOTES
1. \"Have you been to the ER, urgent care clinic since your last visit? Hospitalized since your last visit? \" No    2. \"Have you seen or consulted any other health care providers outside of the [address] [place] [address] since your last visit? \" No     3. For patients aged 39-70: Has the patient had a colonoscopy / FIT/ Cologuard? NA - based on age      If the patient is female:    4. For patients aged 41-77: Has the patient had a mammogram within the past 2 years? NA - based on age or sex      11. For patients aged 21-65: Has the patient had a pap smear?  No    Health Maintenance Due   Topic Date Due    Hepatitis C Screening  Never done    Varicella Vaccine (1 of 2 - 2-dose childhood series) Never done    Pneumococcal 0-64 years (1 - PCV) Never done    Cervical cancer screen  Never done    COVID-19 Vaccine (2 - Booster for Yudi series) 08/10/2021

## 2023-05-09 DIAGNOSIS — E66.01 MORBID (SEVERE) OBESITY DUE TO EXCESS CALORIES (HCC): ICD-10-CM

## 2023-05-09 RX ORDER — PHENTERMINE HYDROCHLORIDE 30 MG/1
CAPSULE ORAL
Qty: 30 CAPSULE | Refills: 0 | Status: SHIPPED | OUTPATIENT
Start: 2023-05-09 | End: 2023-06-08

## 2023-05-24 ENCOUNTER — OFFICE VISIT (OUTPATIENT)
Facility: CLINIC | Age: 33
End: 2023-05-24
Payer: COMMERCIAL

## 2023-05-24 VITALS
WEIGHT: 205.8 LBS | HEIGHT: 65 IN | OXYGEN SATURATION: 96 % | DIASTOLIC BLOOD PRESSURE: 81 MMHG | HEART RATE: 104 BPM | BODY MASS INDEX: 34.29 KG/M2 | RESPIRATION RATE: 18 BRPM | TEMPERATURE: 97.1 F | SYSTOLIC BLOOD PRESSURE: 127 MMHG

## 2023-05-24 DIAGNOSIS — E66.01 SEVERE OBESITY (HCC): Primary | ICD-10-CM

## 2023-05-24 PROCEDURE — 99214 OFFICE O/P EST MOD 30 MIN: CPT | Performed by: FAMILY MEDICINE

## 2023-05-24 RX ORDER — SEMAGLUTIDE 1.34 MG/ML
0.25 INJECTION, SOLUTION SUBCUTANEOUS
Qty: 4 ADJUSTABLE DOSE PRE-FILLED PEN SYRINGE | Refills: 1 | Status: SHIPPED | OUTPATIENT
Start: 2023-05-24

## 2023-05-24 SDOH — ECONOMIC STABILITY: HOUSING INSECURITY
IN THE LAST 12 MONTHS, WAS THERE A TIME WHEN YOU DID NOT HAVE A STEADY PLACE TO SLEEP OR SLEPT IN A SHELTER (INCLUDING NOW)?: NO

## 2023-05-24 SDOH — ECONOMIC STABILITY: FOOD INSECURITY: WITHIN THE PAST 12 MONTHS, YOU WORRIED THAT YOUR FOOD WOULD RUN OUT BEFORE YOU GOT MONEY TO BUY MORE.: NEVER TRUE

## 2023-05-24 SDOH — ECONOMIC STABILITY: FOOD INSECURITY: WITHIN THE PAST 12 MONTHS, THE FOOD YOU BOUGHT JUST DIDN'T LAST AND YOU DIDN'T HAVE MONEY TO GET MORE.: NEVER TRUE

## 2023-05-24 SDOH — ECONOMIC STABILITY: INCOME INSECURITY: HOW HARD IS IT FOR YOU TO PAY FOR THE VERY BASICS LIKE FOOD, HOUSING, MEDICAL CARE, AND HEATING?: NOT HARD AT ALL

## 2023-05-24 ASSESSMENT — PATIENT HEALTH QUESTIONNAIRE - PHQ9
4. FEELING TIRED OR HAVING LITTLE ENERGY: 0
3. TROUBLE FALLING OR STAYING ASLEEP: 0
SUM OF ALL RESPONSES TO PHQ9 QUESTIONS 1 & 2: 0
2. FEELING DOWN, DEPRESSED OR HOPELESS: 0
8. MOVING OR SPEAKING SO SLOWLY THAT OTHER PEOPLE COULD HAVE NOTICED. OR THE OPPOSITE, BEING SO FIGETY OR RESTLESS THAT YOU HAVE BEEN MOVING AROUND A LOT MORE THAN USUAL: 0
6. FEELING BAD ABOUT YOURSELF - OR THAT YOU ARE A FAILURE OR HAVE LET YOURSELF OR YOUR FAMILY DOWN: 0
SUM OF ALL RESPONSES TO PHQ QUESTIONS 1-9: 0
10. IF YOU CHECKED OFF ANY PROBLEMS, HOW DIFFICULT HAVE THESE PROBLEMS MADE IT FOR YOU TO DO YOUR WORK, TAKE CARE OF THINGS AT HOME, OR GET ALONG WITH OTHER PEOPLE: 0
SUM OF ALL RESPONSES TO PHQ QUESTIONS 1-9: 0
1. LITTLE INTEREST OR PLEASURE IN DOING THINGS: 0
5. POOR APPETITE OR OVEREATING: 0
SUM OF ALL RESPONSES TO PHQ QUESTIONS 1-9: 0
7. TROUBLE CONCENTRATING ON THINGS, SUCH AS READING THE NEWSPAPER OR WATCHING TELEVISION: 0
SUM OF ALL RESPONSES TO PHQ QUESTIONS 1-9: 0
9. THOUGHTS THAT YOU WOULD BE BETTER OFF DEAD, OR OF HURTING YOURSELF: 0

## 2023-05-24 ASSESSMENT — ANXIETY QUESTIONNAIRES
2. NOT BEING ABLE TO STOP OR CONTROL WORRYING: 0
GAD7 TOTAL SCORE: 0
4. TROUBLE RELAXING: 0
3. WORRYING TOO MUCH ABOUT DIFFERENT THINGS: 0
7. FEELING AFRAID AS IF SOMETHING AWFUL MIGHT HAPPEN: 0
1. FEELING NERVOUS, ANXIOUS, OR ON EDGE: 0
6. BECOMING EASILY ANNOYED OR IRRITABLE: 0
IF YOU CHECKED OFF ANY PROBLEMS ON THIS QUESTIONNAIRE, HOW DIFFICULT HAVE THESE PROBLEMS MADE IT FOR YOU TO DO YOUR WORK, TAKE CARE OF THINGS AT HOME, OR GET ALONG WITH OTHER PEOPLE: NOT DIFFICULT AT ALL
5. BEING SO RESTLESS THAT IT IS HARD TO SIT STILL: 0

## 2023-05-24 NOTE — PROGRESS NOTES
1. \"Have you been to the ER, urgent care clinic since your last visit? Hospitalized since your last visit?\"     2. \"Have you seen or consulted any other health care providers outside of the [address] [place] [address] since your last visit? \" no     3. For patients aged 39-70: Has the patient had a colonoscopy / FIT/ Cologuard? no      If the patient is female:    4. For patients aged 41-77: Has the patient had a mammogram within the past 2 years?na      5.  For patients aged 21-65: Has the patient had a pap smear?no    Health Maintenance Due   Topic Date Due    Varicella vaccine (1 of 2 - 2-dose childhood series) Never done    Pneumococcal 0-64 years Vaccine (1 - PCV) Never done    HIV screen  Never done    Hepatitis C screen  Never done    Cervical cancer screen  Never done    COVID-19 Vaccine (2 - Booster for Joelle series) 08/10/2021

## 2023-05-24 NOTE — PROGRESS NOTES
[facility]  Follow-up Visit      Date: 05/24/23      CC: Pt is a 28 y.o. female who presents for weight loss counseling, follow-up visit. BMI today: Body mass index is 34.25 kg/m². BMI Readings from Last 3 Encounters:   05/24/23 34.25 kg/m²   04/26/23 34.28 kg/m²   01/25/23 36.61 kg/m²       Weight today: Wt Readings from Last 1 Encounters:   05/24/23 205 lb 12.8 oz (93.4 kg)     Recent Weights: Wt Readings from Last 3 Encounters:   05/24/23 205 lb 12.8 oz (93.4 kg)   04/26/23 206 lb (93.4 kg)   01/25/23 220 lb (99.8 kg)     Weight change from last visit: 1 lbs weight loss  Comorbid conditions: None  Goals for weight loss: 170-180s  Progression towards goals: Yes    HPI:     Current diet: Improved, Multiple small meals     Breakfast: Mixed     Lunch: [place]     Dinner: Mixed,      Snacks: None     Drinks: Unsweet teas, Diet Coke, Water     Compliance with medications: Great     Exercise: None    Health Maintenance Due   Topic Date Due    Varicella vaccine (1 of 2 - 2-dose childhood series) Never done    Pneumococcal 0-64 years Vaccine (1 - PCV) Never done    HIV screen  Never done    Hepatitis C screen  Never done    Cervical cancer screen  Never done    COVID-19 Vaccine (2 - Booster for Joelle series) 08/10/2021       Current Outpatient Medications on File Prior to Visit   Medication Sig Dispense Refill    phentermine 30 MG capsule TAKE ONE CAPSULE BY MOUTH EVERY MORNING *MAX DAILY AMOUNT 30 MG* 30 capsule 0    buPROPion (WELLBUTRIN) 100 MG tablet Take by mouth 2 times daily      ketoconazole (NIZORAL) 2 % shampoo Apply topically as needed      topiramate (TOPAMAX) 50 MG tablet Take by mouth 2 times daily      betamethasone valerate (LUXIQ) 0.12 % FOAM foam Apply topically daily as needed (Patient not taking: Reported on 5/24/2023)       No current facility-administered medications on file prior to visit.          PE:  /81 (Site: Right Upper Arm, Position:

## 2023-05-30 ENCOUNTER — TELEPHONE (OUTPATIENT)
Facility: CLINIC | Age: 33
End: 2023-05-30

## 2023-05-30 DIAGNOSIS — E66.01 SEVERE OBESITY (HCC): Primary | ICD-10-CM

## 2023-05-30 RX ORDER — SEMAGLUTIDE 0.25 MG/.5ML
0.25 INJECTION, SOLUTION SUBCUTANEOUS
Qty: 2 ML | Refills: 1 | Status: SHIPPED | OUTPATIENT
Start: 2023-05-30

## 2023-05-30 NOTE — TELEPHONE ENCOUNTER
Spoke with patient, will try [facility] as Patient Insurance listed as alternative for Ozempic.     MD JONATHAN Arroyo & ALIS ANDERSON [facility]  05/30/23

## 2023-05-31 ENCOUNTER — TELEPHONE (OUTPATIENT)
Facility: CLINIC | Age: 33
End: 2023-05-31

## 2023-05-31 NOTE — TELEPHONE ENCOUNTER
Needs a PA. Pt wants to start medication because she is starting a new job and wants to know the reactions? Please call Pt in regard of the status.

## 2023-06-26 ENCOUNTER — TELEPHONE (OUTPATIENT)
Facility: CLINIC | Age: 33
End: 2023-06-26

## 2023-07-01 ENCOUNTER — PATIENT MESSAGE (OUTPATIENT)
Facility: CLINIC | Age: 33
End: 2023-07-01

## 2023-07-01 DIAGNOSIS — E66.01 SEVERE OBESITY (HCC): Primary | ICD-10-CM

## 2023-07-05 RX ORDER — PHENTERMINE HYDROCHLORIDE 37.5 MG/1
37.5 TABLET ORAL
Qty: 30 TABLET | Refills: 0 | Status: SHIPPED | OUTPATIENT
Start: 2023-07-05 | End: 2023-08-04

## 2024-02-11 DIAGNOSIS — E66.01 SEVERE OBESITY (HCC): ICD-10-CM

## 2024-02-13 RX ORDER — SEMAGLUTIDE 0.25 MG/.5ML
0.25 INJECTION, SOLUTION SUBCUTANEOUS
Qty: 2 ML | Refills: 1 | OUTPATIENT
Start: 2024-02-13

## 2024-02-13 RX ORDER — SEMAGLUTIDE 1.34 MG/ML
0.25 INJECTION, SOLUTION SUBCUTANEOUS
Qty: 4 ADJUSTABLE DOSE PRE-FILLED PEN SYRINGE | Refills: 1 | OUTPATIENT
Start: 2024-02-13

## (undated) DEVICE — SUTURE MCRYL SZ 3-0 L27IN ABSRB UD PS-2 3/8 CIR REV CUT NDL MCP427H

## (undated) DEVICE — SOLUTION IRRIG 1000ML 0.9% SOD CHL USP POUR PLAS BTL

## (undated) DEVICE — STOCKINETTE,IMPERVIOUS,12X48,STERILE: Brand: MEDLINE

## (undated) DEVICE — SUTURE ETHLN SZ 3-0 L18IN NONABSORBABLE BLK PS-2 L19MM 3/8 1669H

## (undated) DEVICE — GLOVE ORTHO 8   MSG9480

## (undated) DEVICE — TOURNIQUET SURGICAL LG ORANGE HEMACLEAR

## (undated) DEVICE — GLOVE SURG SZ 8 L12IN FNGR THK79MIL GRN LTX FREE

## (undated) DEVICE — EXTREMITY-SFMCASU: Brand: MEDLINE INDUSTRIES, INC.

## (undated) DEVICE — DRAPE C-ARMOUR C-ARM KIT --

## (undated) DEVICE — DRESSING,GAUZE,XEROFORM,CURAD,1"X8",ST: Brand: CURAD

## (undated) DEVICE — GLOVE ORANGE PI 7 1/2   MSG9075